# Patient Record
Sex: FEMALE | Race: WHITE | NOT HISPANIC OR LATINO | Employment: FULL TIME | URBAN - METROPOLITAN AREA
[De-identification: names, ages, dates, MRNs, and addresses within clinical notes are randomized per-mention and may not be internally consistent; named-entity substitution may affect disease eponyms.]

---

## 2017-03-15 DIAGNOSIS — E03.8 OTHER SPECIFIED HYPOTHYROIDISM: Primary | ICD-10-CM

## 2017-03-31 DIAGNOSIS — E03.9 ACQUIRED HYPOTHYROIDISM: ICD-10-CM

## 2017-03-31 RX ORDER — LEVOTHYROXINE SODIUM 175 UG/1
TABLET ORAL
Qty: 30 TABLET | Refills: 2 | Status: SHIPPED | OUTPATIENT
Start: 2017-03-31 | End: 2017-07-12 | Stop reason: SDUPTHER

## 2017-06-15 ENCOUNTER — OFFICE VISIT (OUTPATIENT)
Dept: INTERNAL MEDICINE | Age: 37
End: 2017-06-15

## 2017-06-15 VITALS
WEIGHT: 130 LBS | HEART RATE: 62 BPM | HEIGHT: 65 IN | OXYGEN SATURATION: 98 % | SYSTOLIC BLOOD PRESSURE: 110 MMHG | DIASTOLIC BLOOD PRESSURE: 70 MMHG | TEMPERATURE: 98.1 F | BODY MASS INDEX: 21.66 KG/M2

## 2017-06-15 DIAGNOSIS — J02.9 ACUTE PHARYNGITIS, UNSPECIFIED ETIOLOGY: Primary | ICD-10-CM

## 2017-06-15 DIAGNOSIS — E89.0 POSTABLATIVE HYPOTHYROIDISM: Chronic | ICD-10-CM

## 2017-06-15 LAB
T4 FREE SERPL-MCNC: 1.66 NG/DL (ref 0.93–1.7)
TSH SERPL DL<=0.005 MIU/L-ACNC: 12.49 MIU/ML (ref 0.27–4.2)

## 2017-06-15 PROCEDURE — 99213 OFFICE O/P EST LOW 20 MIN: CPT | Performed by: INTERNAL MEDICINE

## 2017-06-15 RX ORDER — AZITHROMYCIN 250 MG/1
TABLET, FILM COATED ORAL
Qty: 6 TABLET | Refills: 0 | Status: SHIPPED | OUTPATIENT
Start: 2017-06-15 | End: 2017-12-14

## 2017-06-15 RX ORDER — CETIRIZINE HYDROCHLORIDE 10 MG/1
10 TABLET ORAL DAILY PRN
COMMUNITY

## 2017-06-15 NOTE — PROGRESS NOTES
"Renetta TRAN Eleanor / 36 y.o. / female  06/15/2017    ASSESSMENT & PLAN:    1. Acute pharyngitis, unspecified etiology    2. Postablative hypothyroidism      Orders Placed This Encounter   Procedures   • TSH+Free T4          Summary/Discussion:     · Z ekta, ibuprofen as needed  · Check TFTs, continue same dose for now      Return in about 6 months (around 12/15/2017) for F/U chronic medical problems.    No future appointments.    ____________________________________________________________________    VITALS    /70  Pulse 62  Temp 98.1 °F (36.7 °C)  Ht 65\" (165.1 cm)  Wt 130 lb (59 kg)  SpO2 98%  BMI 21.63 kg/m2  BP Readings from Last 3 Encounters:   06/15/17 110/70   02/05/17 92/60   12/15/16 122/68     Wt Readings from Last 3 Encounters:   06/15/17 130 lb (59 kg)   02/05/17 127 lb (57.6 kg)   12/15/16 135 lb (61.2 kg)      Body mass index is 21.63 kg/(m^2).    CC:  Main reason(s) for today's visit: Hypothyroidism and Anemia      HPI:     Treated for possible strep at [facility] with amoxicillin x 10 days. C/o ongoing right side sore throat and swollen gland on right side.  Denies fever or chills. Denies cough or sob.     Chronic hypothyroidism s/p ablation for Grave's:  No significant weight change. No change in energy level, change in bowel movements or cold/heat intolerance. Denies significant mood changes. Compliant with medication. Most recent thyroid labs reviewed with the patient:   Lab Results   Component Value Date    TSH 8.760 (H) 12/15/2016    TSH 7.770 (H) 06/13/2016    FREET4 1.76 (H) 12/15/2016    FREET4 1.66 06/13/2016        Patient Care Team:  Óscar Greene MD as PCP - General  ____________________________________________________________________    REVIEW OF SYSTEMS    Review of Systems  As noted per HPI  Constitutional neg   Resp neg   CV neg    Other: As noted per HPI      PHYSICAL EXAMINATION    Physical Exam  Constitutional  No distress   Right posterior pharyngeal erythema  Mildly enlarged " tender anterior cervical LAD  Cardiovascular Rate  normal . Rhythm: regular . Heart sounds:  normal    Pulmonary/Chest  Effort normal. Breath sounds:  normal   Psychiatric  Alert. Judgment and thought content normal. Mood normal      REVIEWED DATA:    Labs:   Lab Results   Component Value Date     06/13/2016    K 4.7 06/13/2016    AST 29 06/13/2016    ALT 23 06/13/2016    BUN 24 (H) 06/13/2016    CREATININE 0.97 06/13/2016    CREATININE 0.97 03/12/2015    EGFRIFNONA 65 06/13/2016    EGFRIFAFRI 79 06/13/2016       Lab Results   Component Value Date    GLU 70 06/13/2016    GLU 78 03/12/2015       No results found for: LDL, HDL, TRIG, CHOLHDLRATIO    Lab Results   Component Value Date    TSH 8.760 (H) 12/15/2016    FREET4 1.76 (H) 12/15/2016          Lab Results   Component Value Date    WBC 3.63 (L) 12/15/2016    HGB 12.8 12/15/2016    HGB 13.7 06/13/2016    HGB 12.8 06/24/2015     12/15/2016        Imaging:        Medical Tests:        Summary of old records / correspondence / consultant report:        Request outside records:        Allergies   Allergen Reactions   • Cefprozil         Current Outpatient Prescriptions   Medication Sig Dispense Refill   • azelastine (ASTELIN) 0.1 % nasal spray 2 sprays into each nostril 2 (two) times a day. Use in each nostril as directed 1 each 5   • calcium carbonate-vitamin d (CALTRATE 600+D) 600-400 MG-UNIT per tablet Take 1 tablet by mouth daily.     • cetirizine (zyrTEC) 10 MG tablet Take 10 mg by mouth Daily.     • cyanocobalamin 1000 MCG/ML injection INJECT 1 MLS SUBCUTANEOUSLY ONCE A MONTH 25 mL 2   • levothyroxine (SYNTHROID, LEVOTHROID) 175 MCG tablet TAKE ONE TABLET BY MOUTH DAILY 30 tablet 2   • norgestimate-ethinyl estradiol (TRI-SPRINTEC) 0.18/0.215/0.25 MG-35 MCG per tablet Take 1 tablet by mouth.         PFSH:     The following portions of the patient's history were reviewed and updated as appropriate: Allergies / Current Medications / Past Medical  History / Surgical History / Social History / Family History    Patient Active Problem List   Diagnosis   • Hypothyroidism   • Pernicious anemia   • Allergic rhinitis due to pollen       Past Medical History:   Diagnosis Date   • Hypothyroidism        Past Surgical History:   Procedure Laterality Date   • BREAST AUGMENTATION         Social History     Social History   • Marital status:      Spouse name: N/A   • Number of children: N/A   • Years of education: N/A     Social History Main Topics   • Smoking status: Never Smoker   • Smokeless tobacco: Never Used   • Alcohol use No   • Drug use: None   • Sexual activity: Not Asked     Other Topics Concern   • None     Social History Narrative       Family History   Problem Relation Age of Onset   • Diabetes Father    • Cancer Paternal Aunt    • Cancer Paternal Uncle    • Cancer Paternal Grandmother          **Nadia Disclaimer:   Much of this encounter note is an electronic transcription/translation of spoken language to printed text. The electronic translation of spoken language may permit erroneous, or at times, nonsensical words or phrases to be inadvertently transcribed. Although I have reviewed the note for such errors, some may still exist.

## 2017-06-16 DIAGNOSIS — E89.0 POSTABLATIVE HYPOTHYROIDISM: Primary | ICD-10-CM

## 2017-06-17 LAB
Lab: NORMAL
T3FREE SERPL-MCNC: 2.2 PG/ML (ref 2–4.4)
WRITTEN AUTHORIZATION: NORMAL

## 2017-07-12 DIAGNOSIS — E03.9 ACQUIRED HYPOTHYROIDISM: ICD-10-CM

## 2017-07-12 RX ORDER — LEVOTHYROXINE SODIUM 175 UG/1
TABLET ORAL
Qty: 30 TABLET | Refills: 5 | Status: SHIPPED | OUTPATIENT
Start: 2017-07-12 | End: 2017-12-18 | Stop reason: SDUPTHER

## 2017-10-26 ENCOUNTER — TELEPHONE (OUTPATIENT)
Dept: INTERNAL MEDICINE | Age: 37
End: 2017-10-26

## 2017-10-26 DIAGNOSIS — D51.0 PERNICIOUS ANEMIA: Primary | Chronic | ICD-10-CM

## 2017-10-26 RX ORDER — SYRINGE W-NEEDLE,DISPOSAB,3 ML 25GX5/8"
SYRINGE, EMPTY DISPOSABLE MISCELLANEOUS
Qty: 100 EACH | Refills: 12 | Status: SHIPPED | OUTPATIENT
Start: 2017-10-26 | End: 2019-12-11 | Stop reason: SDUPTHER

## 2017-12-14 ENCOUNTER — OFFICE VISIT (OUTPATIENT)
Dept: INTERNAL MEDICINE | Age: 37
End: 2017-12-14

## 2017-12-14 VITALS
OXYGEN SATURATION: 99 % | HEART RATE: 58 BPM | BODY MASS INDEX: 21.66 KG/M2 | WEIGHT: 130 LBS | SYSTOLIC BLOOD PRESSURE: 112 MMHG | DIASTOLIC BLOOD PRESSURE: 72 MMHG | TEMPERATURE: 97.6 F | HEIGHT: 65 IN

## 2017-12-14 DIAGNOSIS — D51.0 PERNICIOUS ANEMIA: Chronic | ICD-10-CM

## 2017-12-14 DIAGNOSIS — E89.0 POSTABLATIVE HYPOTHYROIDISM: Primary | Chronic | ICD-10-CM

## 2017-12-14 DIAGNOSIS — R53.83 FATIGUE, UNSPECIFIED TYPE: ICD-10-CM

## 2017-12-14 PROCEDURE — 99214 OFFICE O/P EST MOD 30 MIN: CPT | Performed by: INTERNAL MEDICINE

## 2017-12-14 NOTE — PROGRESS NOTES
"[facility]  SUSAN CASTREJON M.D.      Renetta TRAN Eleanor / 37 y.o. / female  12/14/2017      MEDICATIONS  Current Outpatient Prescriptions   Medication Sig Dispense Refill   • azelastine (ASTELIN) 0.1 % nasal spray 2 sprays into each nostril 2 (two) times a day. Use in each nostril as directed 1 each 5   • calcium carbonate-vitamin d (CALTRATE 600+D) 600-400 MG-UNIT per tablet Take 1 tablet by mouth daily.     • cetirizine (zyrTEC) 10 MG tablet Take 10 mg by mouth Daily.     • cyanocobalamin 1000 MCG/ML injection INJECT 1 MLS SUBCUTANEOUSLY ONCE A MONTH 25 mL 2   • levothyroxine (SYNTHROID, LEVOTHROID) 175 MCG tablet TAKE ONE TABLET BY MOUTH DAILY 30 tablet 5   • norgestimate-ethinyl estradiol (TRI-SPRINTEC) 0.18/0.215/0.25 MG-35 MCG per tablet Take 1 tablet by mouth.     • Syringe 23G X 1\" 3 ML misc Use once a month with B12. 100 each 12     No current facility-administered medications for this visit.          VITALS:    Visit Vitals   • /72   • Pulse 58   • Temp 97.6 °F (36.4 °C)   • Ht 165.1 cm (65\")   • Wt 59 kg (130 lb)   • SpO2 99%   • BMI 21.63 kg/m2       BP Readings from Last 3 Encounters:   12/14/17 112/72   06/15/17 110/70   02/05/17 92/60     Wt Readings from Last 3 Encounters:   12/14/17 59 kg (130 lb)   06/15/17 59 kg (130 lb)   02/05/17 57.6 kg (127 lb)      Body mass index is 21.63 kg/(m^2).    CC:  Main reason(s) for today's visit: Hypothyroidism      HPI:     Chronic hypothyroidism (treated for Grave's):  On stable dose of levothyroxine, no changes in physical symptoms x mild increased fatigue, no change in weight, denies palpitation or chest pain, denies increased anxiety or depression  Lab Results   Component Value Date    TSH 12.490 (H) 06/15/2017    TSH 8.760 (H) 12/15/2016    FREET4 1.66 06/15/2017    FREET4 1.76 (H) 12/15/2016     Pernicious anemia on B12 shots.     C/o increased fatigability when she exercises.   Has regular menstrual cycles.     Patient Care Team:  Óscar Castrejon MD " "as PCP - General  Tiffany Goff MD as Consulting Physician (Obstetrics and Gynecology)  ____________________________________________________________________    ASSESSMENT & PLAN:    Problem List Items Addressed This Visit     Hypothyroidism - Primary (Chronic)    Overview     H/o Grave's Dz s/p Rad I ablation         Current Assessment & Plan     Consider adding Cytomel.          Relevant Medications    levothyroxine (SYNTHROID, LEVOTHROID) 175 MCG tablet    Other Relevant Orders    Comprehensive Metabolic Panel    TSH+Free T4    T3, Free    Pernicious anemia (Chronic)    Relevant Medications    cyanocobalamin 1000 MCG/ML injection    Syringe 23G X 1\" 3 ML misc    Other Relevant Orders    CBC & Differential    Vitamin B12 & Folate    Fe+TIBC+Hussein    Fatigue    Current Assessment & Plan     Nonspecific mild fatigue. Check labs.          Relevant Orders    Vitamin D 25 Hydroxy        Orders Placed This Encounter   Procedures   • Comprehensive Metabolic Panel   • TSH+Free T4   • Vitamin D 25 Hydroxy   • Vitamin B12 & Folate   • Fe+TIBC+Hussein   • T3, Free   • CBC & Differential       Summary/Discussion:  ·     Return in about 6 months (around 6/14/2018) for Annual physical.    Future Appointments  Date Time Provider Department Center   6/7/2018 8:10 AM [facility] None   6/21/2018 9:00 AM [facility] None       ____________________________________________________________________    REVIEW OF SYSTEMS    Review of Systems  As noted per HPI  Constitutional neg fatigue  Resp neg  CV neg  Gi neg  Endo neg  Gu normal menstrual cycles  Right knee pain      PHYSICAL EXAMINATION    Physical Exam  Constitutional  No distress  Neck: No mass, thyroid nodule, thyromegaly or cervical LAD   Cardiovascular Rate  normal . Rhythm: regular . Heart sounds:  Normal  Pulmonary/Chest  Effort normal. Breath sounds:  Normal  Psychiatric  Alert. Judgment and thought content normal. Mood normal    REVIEWED " DATA:    Labs:     Lab Results   Component Value Date     06/13/2016    K 4.7 06/13/2016    AST 29 06/13/2016    ALT 23 06/13/2016    BUN 24 (H) 06/13/2016    CREATININE 0.97 06/13/2016    CREATININE 0.97 03/12/2015    EGFRIFNONA 65 06/13/2016    EGFRIFAFRI 79 06/13/2016       Lab Results   Component Value Date    GLU 70 06/13/2016    GLU 78 03/12/2015       No results found for: LDL, HDL, TRIG, CHOLHDLRATIO    Lab Results   Component Value Date    TSH 12.490 (H) 06/15/2017    FREET4 1.66 06/15/2017       Lab Results   Component Value Date    WBC 3.63 (L) 12/15/2016    HGB 12.8 12/15/2016    HGB 13.7 06/13/2016    HGB 12.8 06/24/2015     12/15/2016       Imaging:         Medical Tests:         Summary of old records / correspondence / consultant report:         Request outside records:         ALLERGIES  Allergies   Allergen Reactions   • Cefprozil         PFSH:     The following portions of the patient's history were reviewed and updated as appropriate: Allergies / Current Medications / Past Medical History / Surgical History / Social History / Family History    PROBLEM LIST   Patient Active Problem List   Diagnosis   • Hypothyroidism   • Pernicious anemia   • Allergic rhinitis due to pollen   • Fatigue       PAST MEDICAL HISTORY  Past Medical History:   Diagnosis Date   • Hypothyroidism        SURGICAL HISTORY  Past Surgical History:   Procedure Laterality Date   • BREAST AUGMENTATION         SOCIAL HISTORY  Social History     Social History   • Marital status:      Spouse name: N/A   • Number of children: N/A   • Years of education: N/A     Social History Main Topics   • Smoking status: Never Smoker   • Smokeless tobacco: Never Used   • Alcohol use No   • Drug use: None   • Sexual activity: Not Asked     Other Topics Concern   • None     Social History Narrative       FAMILY HISTORY  Family History   Problem Relation Age of Onset   • Diabetes Father    • Cancer Paternal Aunt    • Cancer  Paternal Uncle    • Cancer Paternal Grandmother          **Nadia Disclaimer:   Much of this encounter note is an electronic transcription/translation of spoken language to printed text. The electronic translation of spoken language may permit erroneous, or at times, nonsensical words or phrases to be inadvertently transcribed. Although I have reviewed the note for such errors, some may still exist.

## 2017-12-15 LAB
25(OH)D3+25(OH)D2 SERPL-MCNC: 52 NG/ML (ref 30–100)
ALBUMIN SERPL-MCNC: 4.5 G/DL (ref 3.5–5.2)
ALBUMIN/GLOB SERPL: 1.4 G/DL
ALP SERPL-CCNC: 67 U/L (ref 39–117)
ALT SERPL-CCNC: 16 U/L (ref 1–33)
AST SERPL-CCNC: 29 U/L (ref 1–32)
BASOPHILS # BLD AUTO: 0.01 10*3/MM3 (ref 0–0.2)
BASOPHILS NFR BLD AUTO: 0.3 % (ref 0–1.5)
BILIRUB SERPL-MCNC: 0.5 MG/DL (ref 0.1–1.2)
BUN SERPL-MCNC: 23 MG/DL (ref 6–20)
BUN/CREAT SERPL: 27.4 (ref 7–25)
CALCIUM SERPL-MCNC: 9.4 MG/DL (ref 8.6–10.5)
CHLORIDE SERPL-SCNC: 101 MMOL/L (ref 98–107)
CO2 SERPL-SCNC: 28.1 MMOL/L (ref 22–29)
CREAT SERPL-MCNC: 0.84 MG/DL (ref 0.57–1)
EOSINOPHIL # BLD AUTO: 0.07 10*3/MM3 (ref 0–0.7)
EOSINOPHIL NFR BLD AUTO: 2.2 % (ref 0.3–6.2)
ERYTHROCYTE [DISTWIDTH] IN BLOOD BY AUTOMATED COUNT: 13.6 % (ref 11.7–13)
FOLATE SERPL-MCNC: 10.54 NG/ML (ref 4.78–24.2)
GFR SERPLBLD CREATININE-BSD FMLA CKD-EPI: 76 ML/MIN/1.73
GFR SERPLBLD CREATININE-BSD FMLA CKD-EPI: 92 ML/MIN/1.73
GLOBULIN SER CALC-MCNC: 3.2 GM/DL
GLUCOSE SERPL-MCNC: 79 MG/DL (ref 65–99)
HCT VFR BLD AUTO: 41.9 % (ref 35.6–45.5)
HGB BLD-MCNC: 13.4 G/DL (ref 11.9–15.5)
IMM GRANULOCYTES # BLD: 0 10*3/MM3 (ref 0–0.03)
IMM GRANULOCYTES NFR BLD: 0 % (ref 0–0.5)
LYMPHOCYTES # BLD AUTO: 1.22 10*3/MM3 (ref 0.9–4.8)
LYMPHOCYTES NFR BLD AUTO: 38.6 % (ref 19.6–45.3)
MCH RBC QN AUTO: 30.3 PG (ref 26.9–32)
MCHC RBC AUTO-ENTMCNC: 32 G/DL (ref 32.4–36.3)
MCV RBC AUTO: 94.8 FL (ref 80.5–98.2)
MONOCYTES # BLD AUTO: 0.25 10*3/MM3 (ref 0.2–1.2)
MONOCYTES NFR BLD AUTO: 7.9 % (ref 5–12)
NEUTROPHILS # BLD AUTO: 1.61 10*3/MM3 (ref 1.9–8.1)
NEUTROPHILS NFR BLD AUTO: 51 % (ref 42.7–76)
PLATELET # BLD AUTO: 245 10*3/MM3 (ref 140–500)
POTASSIUM SERPL-SCNC: 4.5 MMOL/L (ref 3.5–5.2)
PROT SERPL-MCNC: 7.7 G/DL (ref 6–8.5)
RBC # BLD AUTO: 4.42 10*6/MM3 (ref 3.9–5.2)
SODIUM SERPL-SCNC: 140 MMOL/L (ref 136–145)
T3FREE SERPL-MCNC: 2.5 PG/ML (ref 2–4.4)
T4 FREE SERPL-MCNC: 1.94 NG/DL (ref 0.93–1.7)
TSH SERPL DL<=0.005 MIU/L-ACNC: 8.2 MIU/ML (ref 0.27–4.2)
VIT B12 SERPL-MCNC: 271 PG/ML (ref 211–946)
WBC # BLD AUTO: 3.16 10*3/MM3 (ref 4.5–10.7)

## 2017-12-18 ENCOUNTER — TELEPHONE (OUTPATIENT)
Dept: INTERNAL MEDICINE | Age: 37
End: 2017-12-18

## 2017-12-18 DIAGNOSIS — E53.8 B12 DEFICIENCY: ICD-10-CM

## 2017-12-18 DIAGNOSIS — E03.9 ACQUIRED HYPOTHYROIDISM: ICD-10-CM

## 2017-12-18 RX ORDER — CYANOCOBALAMIN 1000 UG/ML
1000 INJECTION, SOLUTION INTRAMUSCULAR; SUBCUTANEOUS
Qty: 25 ML | Refills: 2 | Status: SHIPPED | OUTPATIENT
Start: 2017-12-18 | End: 2018-09-05 | Stop reason: SDUPTHER

## 2017-12-18 RX ORDER — LEVOTHYROXINE SODIUM 150 MCG
150 TABLET ORAL DAILY
Qty: 90 TABLET | Refills: 1 | Status: SHIPPED | OUTPATIENT
Start: 2017-12-18 | End: 2018-06-29 | Stop reason: SDUPTHER

## 2017-12-18 RX ORDER — LIOTHYRONINE SODIUM 5 UG/1
5 TABLET ORAL EVERY MORNING
Qty: 90 TABLET | Refills: 1 | Status: SHIPPED | OUTPATIENT
Start: 2017-12-18 | End: 2018-02-02 | Stop reason: SDUPTHER

## 2017-12-18 NOTE — ASSESSMENT & PLAN NOTE
Pt to decrease synthroid to 150 mcg and add cytomel 5 mcg in the morning pt was unable to schedule 6 week lab apt will call back to schedule for 6 weeks TFT's ALVIN

## 2017-12-18 NOTE — PROGRESS NOTES
Call patient with her test result(s) and mail the results to her if MyChart is NOT active.    1. Thyroid level is still not where it needs to be. Decrease Synthroid (No Substitute) to 150 mcg (verify on 175). Start Cytomel 5 mcg qAM (this is T3). Recheck TSH, FREE T4 AND FREE T3 in 6 weeks. Watch for any unusual change in energy level, anxiety, palpitations, etc.   2. B12 is low: Make sure to take B12 shots regularly as instructed. If she has not been taking it regularly, take it twice monthly x 3 months then once monthly.

## 2017-12-18 NOTE — TELEPHONE ENCOUNTER
----- Message from Óscar Greene MD sent at 12/18/2017  7:48 AM EST -----  Call patient with her test result(s) and mail the results to her if MyChart is NOT active.    1. Thyroid level is still not where it needs to be. Decrease Synthroid (No Substitute) to 150 mcg (verify on 175). Start Cytomel 5 mcg qAM (this is T3). Recheck TSH, FREE T4 AND FREE T3 in 6 weeks. Watch for any unusual change in energy level, anxiety, palpitations, etc.   2. B12 is low: Make sure to take B12 shots regularly as instructed. If she has not been taking it regularly, take it twice monthly x 3 months then once monthly.

## 2018-01-26 DIAGNOSIS — E89.0 POSTABLATIVE HYPOTHYROIDISM: Primary | ICD-10-CM

## 2018-01-26 DIAGNOSIS — E03.9 ACQUIRED HYPOTHYROIDISM: ICD-10-CM

## 2018-02-02 ENCOUNTER — TELEPHONE (OUTPATIENT)
Dept: INTERNAL MEDICINE | Age: 38
End: 2018-02-02

## 2018-02-02 LAB
T3FREE SERPL-MCNC: 2.9 PG/ML (ref 2–4.4)
T4 FREE SERPL-MCNC: 1.56 NG/DL (ref 0.93–1.7)
TSH SERPL DL<=0.005 MIU/L-ACNC: 8.96 MIU/ML (ref 0.27–4.2)

## 2018-02-02 RX ORDER — LIOTHYRONINE SODIUM 5 UG/1
10 TABLET ORAL EVERY MORNING
Qty: 60 TABLET | Refills: 3 | Status: SHIPPED | OUTPATIENT
Start: 2018-02-02 | End: 2018-05-23 | Stop reason: SDUPTHER

## 2018-02-02 NOTE — TELEPHONE ENCOUNTER
----- Message from Óscar Greene MD sent at 2/2/2018  7:52 AM EST -----  Call patient with her test result(s) and mail the results to her if MyChart is NOT active.    Increase Cytomel (T3) to 5 mcg TWO daily. I have sent the Rx to the pharmacy already. Schedule TSH, Free T4 and Free T3 for 2 months.

## 2018-02-02 NOTE — TELEPHONE ENCOUNTER
LM for pt to call back to schedule lab apt and discuss lab if she would like to do so. ALVIN    Increased cytomel 5 mcg to two tabs daily per Dr Greene. Will recheck labs in two months

## 2018-05-23 DIAGNOSIS — E03.9 ACQUIRED HYPOTHYROIDISM: ICD-10-CM

## 2018-05-23 RX ORDER — LIOTHYRONINE SODIUM 5 UG/1
TABLET ORAL
Qty: 90 TABLET | Refills: 0 | Status: SHIPPED | OUTPATIENT
Start: 2018-05-23 | End: 2018-07-11 | Stop reason: SDUPTHER

## 2018-06-06 DIAGNOSIS — Z00.00 PREVENTATIVE HEALTH CARE: Primary | ICD-10-CM

## 2018-06-29 ENCOUNTER — TELEPHONE (OUTPATIENT)
Dept: INTERNAL MEDICINE | Age: 38
End: 2018-06-29

## 2018-06-29 DIAGNOSIS — E03.9 ACQUIRED HYPOTHYROIDISM: ICD-10-CM

## 2018-06-29 RX ORDER — LEVOTHYROXINE SODIUM 150 MCG
150 TABLET ORAL DAILY
Qty: 30 TABLET | Refills: 0 | Status: SHIPPED | OUTPATIENT
Start: 2018-06-29 | End: 2018-07-09 | Stop reason: SDUPTHER

## 2018-07-09 ENCOUNTER — OFFICE VISIT (OUTPATIENT)
Dept: INTERNAL MEDICINE | Age: 38
End: 2018-07-09

## 2018-07-09 VITALS
HEIGHT: 65 IN | WEIGHT: 135 LBS | SYSTOLIC BLOOD PRESSURE: 112 MMHG | BODY MASS INDEX: 22.49 KG/M2 | TEMPERATURE: 98.8 F | DIASTOLIC BLOOD PRESSURE: 72 MMHG | OXYGEN SATURATION: 100 % | HEART RATE: 54 BPM

## 2018-07-09 DIAGNOSIS — D51.0 PERNICIOUS ANEMIA: Chronic | ICD-10-CM

## 2018-07-09 DIAGNOSIS — E03.9 ACQUIRED HYPOTHYROIDISM: Primary | ICD-10-CM

## 2018-07-09 PROCEDURE — 99213 OFFICE O/P EST LOW 20 MIN: CPT | Performed by: INTERNAL MEDICINE

## 2018-07-09 RX ORDER — LEVOTHYROXINE SODIUM 150 MCG
150 TABLET ORAL DAILY
Qty: 90 TABLET | Refills: 0 | Status: SHIPPED | OUTPATIENT
Start: 2018-07-09 | End: 2018-08-17

## 2018-07-09 NOTE — PROGRESS NOTES
"[facility] [place] [facility]  SUSAN CASTREJON M.D.      Renetta TRAN Eleanor / 37 y.o. / female  07/09/2018      ASSESSMENT & PLAN:    Problem List Items Addressed This Visit        High    Hypothyroidism - Primary (Chronic)    Overview     H/o Grave's Dz s/p Rad I ablation         Current Assessment & Plan     Had been taking Cytomel 5 mg twice daily 3 days a week along with Synthroid 150 mcg daily. Check labs. Will need to modify dose of Cytomel pending labs to either 10 mcg dose or taking 5 mcg every other day.          Relevant Medications    liothyronine (CYTOMEL) 5 MCG tablet    SYNTHROID 150 MCG tablet    Other Relevant Orders    TSH+Free T4    T3, Free    Lipid Panel With / Chol / HDL Ratio       Medium    Pernicious anemia (Chronic)    Current Assessment & Plan     Continue B12 injections twice monthly.          Relevant Medications    Syringe 23G X 1\" 3 ML misc    cyanocobalamin 1000 MCG/ML injection    Other Relevant Orders    CBC & Differential    Comprehensive Metabolic Panel        Orders Placed This Encounter   Procedures   • TSH+Free T4   • T3, Free   • Comprehensive Metabolic Panel   • Lipid Panel With / Chol / HDL Ratio   • CBC & Differential     New Medications Ordered This Visit   Medications   • SYNTHROID 150 MCG tablet     Sig: Take 1 tablet by mouth Daily.     Dispense:  90 tablet     Refill:  0       Summary/Discussion:      Return in about 6 months (around 1/9/2019) for Hypothyroidism.    ____________________________________________________________________    MEDICATIONS  Current Outpatient Prescriptions   Medication Sig Dispense Refill   • azelastine (ASTELIN) 0.1 % nasal spray 2 sprays into each nostril 2 (two) times a day. Use in each nostril as directed 1 each 5   • calcium carbonate-vitamin d (CALTRATE 600+D) 600-400 MG-UNIT per tablet Take 1 tablet by mouth daily.     • cetirizine (zyrTEC) 10 MG tablet Take 10 mg by mouth Daily.     • cyanocobalamin 1000 MCG/ML injection Inject 1 mL into the shoulder, " "thigh, or buttocks Every 28 (Twenty-Eight) Days. 25 mL 2   • liothyronine (CYTOMEL) 5 MCG tablet Was taking twice daily 3 days of week and 1 a day all other days 90 tablet 0   • norgestimate-ethinyl estradiol (TRI-SPRINTEC) 0.18/0.215/0.25 MG-35 MCG per tablet Take 1 tablet by mouth.     • SYNTHROID 150 MCG tablet Take 1 tablet by mouth Daily. 90 tablet 0   • Syringe 23G X 1\" 3 ML misc Use once a month with B12. 100 each 12     No current facility-administered medications for this visit.          VITALS:    Visit Vitals  /72   Pulse 54   Temp 98.8 °F (37.1 °C)   Ht 165.1 cm (65\")   Wt 61.2 kg (135 lb)   SpO2 100%   BMI 22.47 kg/m²       BP Readings from Last 3 Encounters:   07/09/18 112/72   12/14/17 112/72   06/15/17 110/70     Wt Readings from Last 3 Encounters:   07/09/18 61.2 kg (135 lb)   12/14/17 59 kg (130 lb)   06/15/17 59 kg (130 lb)      Body mass index is 22.47 kg/m².    CC:  Main reason(s) for today's visit: Hypothyroidism      HPI:     Chronic hypothyroidism:  no changes in physical symptoms. Had been taking Cytomel 5 mg twice daily 3 days a week along with Synthroid 150 mcg daily.  Lab Results   Component Value Date    TSH 8.960 (H) 02/01/2018    TSH 8.200 (H) 12/14/2017    FREET4 1.56 02/01/2018    FREET4 1.94 (H) 12/14/2017     Pernicious anemia: feels better taking B12 shots twice monthly.     Patient Care Team:  Óscar Greene MD as PCP - General  Tiffany Goff MD as Consulting Physician (Obstetrics and Gynecology)    ____________________________________________________________________    REVIEW OF SYSTEMS    Review of Systems  Constitutional neg  Resp neg  CV neg  GI neg  Neuro neg  Psych neg      PHYSICAL EXAMINATION    Physical Exam  Constitutional  No distress  Neck: No mass, thyroid nodule, thyromegaly or cervical LAD   Cardiovascular Rate  normal . Rhythm: regular . Heart sounds:  Normal  Pulmonary/Chest  Effort normal. Breath sounds:  Normal  Psychiatric  Alert. Judgment and thought " content normal. Mood normal    REVIEWED DATA:    Labs:     Lab Results   Component Value Date     12/14/2017    K 4.5 12/14/2017    AST 29 12/14/2017    ALT 16 12/14/2017    BUN 23 (H) 12/14/2017    CREATININE 0.84 12/14/2017    CREATININE 0.97 06/13/2016    CREATININE 0.97 03/12/2015    EGFRIFNONA 76 12/14/2017    EGFRIFAFRI 92 12/14/2017       No results found for: HGBA1C, GLUCOSE, MICROALBUR    No results found for: LDL, HDL, TRIG, CHOLHDLRATIO    Lab Results   Component Value Date    TSH 8.960 (H) 02/01/2018    FREET4 1.56 02/01/2018       Lab Results   Component Value Date    WBC 3.16 (L) 12/14/2017    HGB 13.4 12/14/2017    HGB 12.8 12/15/2016    HGB 13.7 06/13/2016     12/14/2017       Imaging:         Medical Tests:         Summary of old records / correspondence / consultant report:         Request outside records:         ALLERGIES  Allergies   Allergen Reactions   • Cefprozil         PFSH:     The following portions of the patient's history were reviewed and updated as appropriate: Allergies / Current Medications / Past Medical History / Surgical History / Social History / Family History    PROBLEM LIST   Patient Active Problem List   Diagnosis   • Hypothyroidism   • Pernicious anemia   • Allergic rhinitis due to pollen   • Fatigue       PAST MEDICAL HISTORY  Past Medical History:   Diagnosis Date   • Hypothyroidism        SURGICAL HISTORY  Past Surgical History:   Procedure Laterality Date   • BREAST AUGMENTATION         SOCIAL HISTORY  Social History     Social History   • Marital status:      Social History Main Topics   • Smoking status: Never Smoker   • Smokeless tobacco: Never Used   • Alcohol use No   • Drug use: Unknown     Other Topics Concern   • Not on file       FAMILY HISTORY  Family History   Problem Relation Age of Onset   • Diabetes Father    • Cancer Paternal Aunt    • Cancer Paternal Uncle    • Cancer Paternal Grandmother          **Nadia Disclaimer:   Much of this  encounter note is an electronic transcription/translation of spoken language to printed text. The electronic translation of spoken language may permit erroneous, or at times, nonsensical words or phrases to be inadvertently transcribed. Although I have reviewed the note for such errors, some may still exist.

## 2018-07-09 NOTE — ASSESSMENT & PLAN NOTE
Had been taking Cytomel 5 mg twice daily 3 days a week along with Synthroid 150 mcg daily. Check labs. Will need to modify dose of Cytomel pending labs to either 10 mcg dose or taking 5 mcg every other day.

## 2018-07-10 LAB
ALBUMIN SERPL-MCNC: 4.4 G/DL (ref 3.5–5.2)
ALBUMIN/GLOB SERPL: 1.6 G/DL
ALP SERPL-CCNC: 54 U/L (ref 39–117)
ALT SERPL-CCNC: 13 U/L (ref 1–33)
AST SERPL-CCNC: 24 U/L (ref 1–32)
BASOPHILS # BLD AUTO: 0.01 10*3/MM3 (ref 0–0.2)
BASOPHILS NFR BLD AUTO: 0.4 % (ref 0–1.5)
BILIRUB SERPL-MCNC: 0.4 MG/DL (ref 0.1–1.2)
BUN SERPL-MCNC: 20 MG/DL (ref 6–20)
BUN/CREAT SERPL: 18.9 (ref 7–25)
CALCIUM SERPL-MCNC: 9.6 MG/DL (ref 8.6–10.5)
CHLORIDE SERPL-SCNC: 103 MMOL/L (ref 98–107)
CHOLEST SERPL-MCNC: 178 MG/DL (ref 0–200)
CHOLEST/HDLC SERPL: 3.12 {RATIO}
CO2 SERPL-SCNC: 25.8 MMOL/L (ref 22–29)
CREAT SERPL-MCNC: 1.06 MG/DL (ref 0.57–1)
EOSINOPHIL # BLD AUTO: 0.06 10*3/MM3 (ref 0–0.7)
EOSINOPHIL NFR BLD AUTO: 2.1 % (ref 0.3–6.2)
ERYTHROCYTE [DISTWIDTH] IN BLOOD BY AUTOMATED COUNT: 12.9 % (ref 11.7–13)
GLOBULIN SER CALC-MCNC: 2.7 GM/DL
GLUCOSE SERPL-MCNC: 68 MG/DL (ref 65–99)
HCT VFR BLD AUTO: 41.6 % (ref 35.6–45.5)
HDLC SERPL-MCNC: 57 MG/DL (ref 40–60)
HGB BLD-MCNC: 13.9 G/DL (ref 11.9–15.5)
IMM GRANULOCYTES # BLD: 0 10*3/MM3 (ref 0–0.03)
IMM GRANULOCYTES NFR BLD: 0 % (ref 0–0.5)
LDLC SERPL CALC-MCNC: 109 MG/DL (ref 0–100)
LYMPHOCYTES # BLD AUTO: 1.08 10*3/MM3 (ref 0.9–4.8)
LYMPHOCYTES NFR BLD AUTO: 38.2 % (ref 19.6–45.3)
MCH RBC QN AUTO: 32.3 PG (ref 26.9–32)
MCHC RBC AUTO-ENTMCNC: 33.4 G/DL (ref 32.4–36.3)
MCV RBC AUTO: 96.7 FL (ref 80.5–98.2)
MONOCYTES # BLD AUTO: 0.23 10*3/MM3 (ref 0.2–1.2)
MONOCYTES NFR BLD AUTO: 8.1 % (ref 5–12)
NEUTROPHILS # BLD AUTO: 1.45 10*3/MM3 (ref 1.9–8.1)
NEUTROPHILS NFR BLD AUTO: 51.2 % (ref 42.7–76)
PLATELET # BLD AUTO: 207 10*3/MM3 (ref 140–500)
POTASSIUM SERPL-SCNC: 4.7 MMOL/L (ref 3.5–5.2)
PROT SERPL-MCNC: 7.1 G/DL (ref 6–8.5)
RBC # BLD AUTO: 4.3 10*6/MM3 (ref 3.9–5.2)
SODIUM SERPL-SCNC: 142 MMOL/L (ref 136–145)
T3FREE SERPL-MCNC: 3.2 PG/ML (ref 2–4.4)
T4 FREE SERPL-MCNC: 1.45 NG/DL (ref 0.93–1.7)
TRIGL SERPL-MCNC: 59 MG/DL (ref 0–150)
TSH SERPL DL<=0.005 MIU/L-ACNC: 13.86 MIU/ML (ref 0.27–4.2)
VLDLC SERPL CALC-MCNC: 11.8 MG/DL (ref 5–40)
WBC # BLD AUTO: 2.83 10*3/MM3 (ref 4.5–10.7)

## 2018-07-10 NOTE — PROGRESS NOTES
Call patient with her test result(s) and mail the results to her if MyChart is NOT active.    1. Thyroid level is marginal. Continue Synthroid 150 mcg daily but increase Cytomel to 10 mcg qAM. Recheck TSH, Free T3 AND Free T4 levels in 6 weeks.   2. WBC is little lower. Will follow.   3. Cholesterol level is okay.     (REMINDER: Update med list, maintain dx association, and update change on CURRENT ASSESSMENT/PLAN)

## 2018-07-11 DIAGNOSIS — E03.9 ACQUIRED HYPOTHYROIDISM: ICD-10-CM

## 2018-07-11 RX ORDER — LIOTHYRONINE SODIUM 5 UG/1
TABLET ORAL
Qty: 180 TABLET | Refills: 0 | Status: SHIPPED | OUTPATIENT
Start: 2018-07-11 | End: 2018-08-24 | Stop reason: SDUPTHER

## 2018-08-15 DIAGNOSIS — E89.0 POSTABLATIVE HYPOTHYROIDISM: Primary | Chronic | ICD-10-CM

## 2018-08-17 ENCOUNTER — TELEPHONE (OUTPATIENT)
Dept: INTERNAL MEDICINE | Age: 38
End: 2018-08-17

## 2018-08-17 DIAGNOSIS — E03.9 ACQUIRED HYPOTHYROIDISM: ICD-10-CM

## 2018-08-17 LAB
T3FREE SERPL-MCNC: 3.9 PG/ML (ref 2–4.4)
T4 FREE SERPL-MCNC: 1.42 NG/DL (ref 0.93–1.7)
TSH SERPL DL<=0.005 MIU/L-ACNC: 12.82 MIU/ML (ref 0.27–4.2)

## 2018-08-17 RX ORDER — LEVOTHYROXINE SODIUM 175 MCG
175 TABLET ORAL DAILY
Qty: 30 TABLET | Refills: 3 | Status: SHIPPED | OUTPATIENT
Start: 2018-08-17 | End: 2018-10-18

## 2018-08-17 NOTE — TELEPHONE ENCOUNTER
Pt results and new medication via my chart let her know to,call back and schedule TFT's in 2 months. Pt told to call back if she would like to go over results by phone. ALVIN

## 2018-08-17 NOTE — PROGRESS NOTES
Call patient with her test result(s) and mail the results to her if MyChart is NOT active.    Increase levothyroxine to 175 mcg (confirm on 150 mcg) (SYNTHROID / NO SUBSTITUTE) and continue Cytomel at 10 mg daily. Recheck TFT's 2 months

## 2018-08-17 NOTE — TELEPHONE ENCOUNTER
----- Message from Óscar Greene MD sent at 8/17/2018  8:48 AM EDT -----  Call patient with her test result(s) and mail the results to her if MyChart is NOT active.    Increase levothyroxine to 175 mcg (confirm on 150 mcg) (SYNTHROID / NO SUBSTITUTE) and continue Cytomel at 10 mg daily. Recheck TFT's 2 months

## 2018-08-22 ENCOUNTER — TELEPHONE (OUTPATIENT)
Dept: INTERNAL MEDICINE | Age: 38
End: 2018-08-22

## 2018-08-22 DIAGNOSIS — E89.0 POSTABLATIVE HYPOTHYROIDISM: Primary | Chronic | ICD-10-CM

## 2018-08-22 RX ORDER — LEVOTHYROXINE SODIUM 25 MCG
25 TABLET ORAL DAILY
Qty: 30 TABLET | Refills: 0 | Status: SHIPPED | OUTPATIENT
Start: 2018-08-22 | End: 2018-10-18

## 2018-08-22 NOTE — TELEPHONE ENCOUNTER
Pt is wanting to know since her Levothyroxine was raised to 175 mg and she has about 20 days left of her 150 mg, pt asking if Dr Greene could prescribe her a 25 mg to add to her 150 so she doesn't waste them?  Pt's # [phone]  Thanks SP

## 2018-08-24 DIAGNOSIS — E03.9 ACQUIRED HYPOTHYROIDISM: ICD-10-CM

## 2018-08-24 RX ORDER — LIOTHYRONINE SODIUM 5 UG/1
TABLET ORAL
Qty: 90 TABLET | Refills: 3 | Status: SHIPPED | OUTPATIENT
Start: 2018-08-24 | End: 2018-11-06 | Stop reason: SDUPTHER

## 2018-09-05 ENCOUNTER — TELEPHONE (OUTPATIENT)
Dept: INTERNAL MEDICINE | Age: 38
End: 2018-09-05

## 2018-09-05 DIAGNOSIS — E53.8 B12 DEFICIENCY: ICD-10-CM

## 2018-09-05 RX ORDER — CYANOCOBALAMIN 1000 UG/ML
1000 INJECTION, SOLUTION INTRAMUSCULAR; SUBCUTANEOUS
Qty: 25 ML | Refills: 2 | Status: SHIPPED | OUTPATIENT
Start: 2018-09-05 | End: 2019-12-11 | Stop reason: SDUPTHER

## 2018-09-05 NOTE — TELEPHONE ENCOUNTER
Patient called to request a RX refill on her Cyanocobalmin 1000mcg/ML injection, she gives herself the injection every two weeks.  Her insurance is requiring her to switch to [facility] [employer] on [address] @ [phone].

## 2018-10-12 ENCOUNTER — RESULTS ENCOUNTER (OUTPATIENT)
Dept: INTERNAL MEDICINE | Age: 38
End: 2018-10-12

## 2018-10-12 DIAGNOSIS — E03.9 ACQUIRED HYPOTHYROIDISM: ICD-10-CM

## 2018-10-12 NOTE — TELEPHONE ENCOUNTER
Rx sent to pharmacy for qty of 30. Pt no-showed last appt, as well as multiple lab appts.     KD  
SYNTHROID 150 MCG tablet Take 1 tablet by mouth Daily.    [facility] [place] [place] - [address] AT [address]  - [phone]  - [phone] FX    Made patient an appt for 7/9/18. She needs enough medicine to make to appt.    Thank you    
89

## 2018-10-17 LAB
T4 FREE SERPL-MCNC: 1.22 NG/DL (ref 0.93–1.7)
TSH SERPL DL<=0.005 MIU/L-ACNC: 7.21 MIU/ML (ref 0.27–4.2)

## 2018-10-18 DIAGNOSIS — E89.0 POSTABLATIVE HYPOTHYROIDISM: Chronic | ICD-10-CM

## 2018-10-18 DIAGNOSIS — E03.9 ACQUIRED HYPOTHYROIDISM: ICD-10-CM

## 2018-10-18 RX ORDER — LEVOTHYROXINE SODIUM 88 MCG
TABLET ORAL
Qty: 90 TABLET | Refills: 0 | Status: SHIPPED | OUTPATIENT
Start: 2018-10-18 | End: 2018-11-06 | Stop reason: SDUPTHER

## 2018-10-18 NOTE — ASSESSMENT & PLAN NOTE
Pt to increase Synthroid brand name to  2 88 mcg tabs in the Am and recheck TFT's in 2 months. Per Dr Greene. MORGANJ

## 2018-11-06 DIAGNOSIS — E89.0 POSTABLATIVE HYPOTHYROIDISM: Chronic | ICD-10-CM

## 2018-11-06 DIAGNOSIS — E03.9 ACQUIRED HYPOTHYROIDISM: ICD-10-CM

## 2018-11-06 RX ORDER — LIOTHYRONINE SODIUM 5 UG/1
10 TABLET ORAL EVERY MORNING
Qty: 180 TABLET | Refills: 1 | Status: SHIPPED | OUTPATIENT
Start: 2018-11-06 | End: 2019-05-08 | Stop reason: SDUPTHER

## 2018-11-06 RX ORDER — LEVOTHYROXINE SODIUM 88 MCG
TABLET ORAL
Qty: 180 TABLET | Refills: 1 | Status: SHIPPED | OUTPATIENT
Start: 2018-11-06 | End: 2018-11-16 | Stop reason: SDUPTHER

## 2018-11-16 DIAGNOSIS — E03.9 ACQUIRED HYPOTHYROIDISM: ICD-10-CM

## 2018-11-16 DIAGNOSIS — E89.0 POSTABLATIVE HYPOTHYROIDISM: Chronic | ICD-10-CM

## 2018-11-16 RX ORDER — LEVOTHYROXINE SODIUM 88 UG/1
TABLET ORAL
Qty: 90 TABLET | Refills: 1 | Status: SHIPPED | OUTPATIENT
Start: 2018-11-16 | End: 2019-06-03 | Stop reason: SDUPTHER

## 2018-12-17 DIAGNOSIS — E89.0 POSTABLATIVE HYPOTHYROIDISM: Primary | Chronic | ICD-10-CM

## 2019-05-08 DIAGNOSIS — E03.9 ACQUIRED HYPOTHYROIDISM: ICD-10-CM

## 2019-05-08 RX ORDER — LIOTHYRONINE SODIUM 5 UG/1
10 TABLET ORAL EVERY MORNING
Qty: 60 TABLET | Refills: 0 | Status: SHIPPED | OUTPATIENT
Start: 2019-05-08 | End: 2019-06-03 | Stop reason: SDUPTHER

## 2019-06-01 DIAGNOSIS — E03.9 ACQUIRED HYPOTHYROIDISM: ICD-10-CM

## 2019-06-01 DIAGNOSIS — E89.0 POSTABLATIVE HYPOTHYROIDISM: Chronic | ICD-10-CM

## 2019-06-03 RX ORDER — LIOTHYRONINE SODIUM 5 UG/1
10 TABLET ORAL EVERY MORNING
Qty: 30 TABLET | Refills: 0 | Status: SHIPPED | OUTPATIENT
Start: 2019-06-03 | End: 2019-07-13 | Stop reason: SDUPTHER

## 2019-06-03 RX ORDER — LEVOTHYROXINE SODIUM 88 MCG
TABLET ORAL
Qty: 30 TABLET | Refills: 0 | Status: SHIPPED | OUTPATIENT
Start: 2019-06-03 | End: 2019-07-13 | Stop reason: SDUPTHER

## 2019-06-13 DIAGNOSIS — E03.9 ACQUIRED HYPOTHYROIDISM: ICD-10-CM

## 2019-06-13 RX ORDER — LIOTHYRONINE SODIUM 5 UG/1
10 TABLET ORAL EVERY MORNING
Qty: 60 TABLET | Refills: 0 | OUTPATIENT
Start: 2019-06-13

## 2019-07-13 DIAGNOSIS — E03.9 ACQUIRED HYPOTHYROIDISM: ICD-10-CM

## 2019-07-13 DIAGNOSIS — E89.0 POSTABLATIVE HYPOTHYROIDISM: Chronic | ICD-10-CM

## 2019-07-15 RX ORDER — LEVOTHYROXINE SODIUM 88 MCG
TABLET ORAL
Qty: 15 TABLET | Refills: 0 | Status: SHIPPED | OUTPATIENT
Start: 2019-07-15 | End: 2019-09-05 | Stop reason: SDUPTHER

## 2019-07-15 RX ORDER — LIOTHYRONINE SODIUM 5 UG/1
10 TABLET ORAL EVERY MORNING
Qty: 30 TABLET | Refills: 0 | Status: SHIPPED | OUTPATIENT
Start: 2019-07-15 | End: 2019-09-05 | Stop reason: SDUPTHER

## 2019-08-31 LAB
T3FREE SERPL-MCNC: 5.1 PG/ML (ref 2–4.4)
T4 FREE SERPL-MCNC: 1.73 NG/DL (ref 0.93–1.7)
TSH SERPL DL<=0.005 MIU/L-ACNC: 1.77 UIU/ML (ref 0.27–4.2)

## 2019-09-05 DIAGNOSIS — E03.9 ACQUIRED HYPOTHYROIDISM: ICD-10-CM

## 2019-09-05 DIAGNOSIS — E89.0 POSTABLATIVE HYPOTHYROIDISM: Chronic | ICD-10-CM

## 2019-09-05 RX ORDER — LEVOTHYROXINE SODIUM 88 MCG
TABLET ORAL
Qty: 60 TABLET | Refills: 2 | Status: SHIPPED | OUTPATIENT
Start: 2019-09-05 | End: 2019-12-11 | Stop reason: SDUPTHER

## 2019-09-05 RX ORDER — LIOTHYRONINE SODIUM 5 UG/1
5 TABLET ORAL EVERY MORNING
Qty: 30 TABLET | Refills: 2 | Status: SHIPPED | OUTPATIENT
Start: 2019-09-05 | End: 2019-09-05 | Stop reason: SDUPTHER

## 2019-09-05 RX ORDER — LIOTHYRONINE SODIUM 5 UG/1
5 TABLET ORAL EVERY MORNING
Qty: 30 TABLET | Refills: 2 | Status: SHIPPED | OUTPATIENT
Start: 2019-09-05 | End: 2019-12-11 | Stop reason: SDUPTHER

## 2019-09-05 RX ORDER — LEVOTHYROXINE SODIUM 88 MCG
TABLET ORAL
Qty: 60 TABLET | Refills: 2 | Status: SHIPPED | OUTPATIENT
Start: 2019-09-05 | End: 2019-09-05 | Stop reason: SDUPTHER

## 2019-12-06 ENCOUNTER — OFFICE VISIT (OUTPATIENT)
Dept: INTERNAL MEDICINE | Age: 39
End: 2019-12-06

## 2019-12-06 VITALS
TEMPERATURE: 97.2 F | WEIGHT: 135 LBS | BODY MASS INDEX: 21.69 KG/M2 | DIASTOLIC BLOOD PRESSURE: 52 MMHG | OXYGEN SATURATION: 99 % | HEART RATE: 66 BPM | SYSTOLIC BLOOD PRESSURE: 94 MMHG | HEIGHT: 66 IN

## 2019-12-06 DIAGNOSIS — D51.0 PERNICIOUS ANEMIA: Chronic | ICD-10-CM

## 2019-12-06 DIAGNOSIS — E89.0 POSTABLATIVE HYPOTHYROIDISM: Primary | Chronic | ICD-10-CM

## 2019-12-06 DIAGNOSIS — R53.83 FATIGUE, UNSPECIFIED TYPE: Chronic | ICD-10-CM

## 2019-12-06 PROCEDURE — 99214 OFFICE O/P EST MOD 30 MIN: CPT | Performed by: INTERNAL MEDICINE

## 2019-12-06 RX ORDER — ASCORBIC ACID 500 MG
1 TABLET ORAL DAILY
COMMUNITY
End: 2020-07-17 | Stop reason: ALTCHOICE

## 2019-12-06 RX ORDER — VALACYCLOVIR HYDROCHLORIDE 500 MG/1
500 TABLET, FILM COATED ORAL
COMMUNITY
End: 2020-07-17 | Stop reason: ALTCHOICE

## 2019-12-06 NOTE — PATIENT INSTRUCTIONS
** IMPORTANT MESSAGE FROM DR. CASTREJON **    In our office, your satisfaction is VERY important to us.     You may receive a survey from [facility] by mail or E-mail for you to provide feedback about your visit. This information is invaluable for me to know what we can do to improve our services.     I ask that you please take a few minutes to complete the survey and let us know how we are doing in serving your needs. (You may receive the survey more than once for multiple visits)    Thank You !    Dr. Castrejon & Staff    __________________________________________________________      ADDITIONAL INSTRUCTION / REMINDERS FROM DR. CASTREJON

## 2019-12-06 NOTE — PROGRESS NOTES
"[facility] [place] INTERNAL MEDICINE  SUSAN CASTREJON M.D.      Renetta TRAN Eleanor / 39 y.o. / female  12/06/2019      CHIEF COMPLAINT     Hypothyroidism (f/u, labs)      ASSESSMENT & PLAN     Problem List Items Addressed This Visit        High    Hypothyroidism - Primary (Chronic)    Overview     H/o Grave's Dz s/p Rad I ablation         Current Assessment & Plan     Previously decreased dose of Cytomel.   Check labs today including FT3.          Relevant Medications    SYNTHROID 88 MCG tablet    liothyronine (CYTOMEL) 5 MCG tablet    Other Relevant Orders    TSH+Free T4    T3, Free       Medium    Pernicious anemia (Chronic)    Current Assessment & Plan     Continue B12 injections. Check complete blood count.          Relevant Medications    Syringe 23G X 1\" 3 ML misc    cyanocobalamin 1000 MCG/ML injection    Other Relevant Orders    CBC & Differential       Low    Fatigue (Chronic)    Current Assessment & Plan     Nonspecific. Could consider trial of increasing B12 to twice monthly to see if this helps.              Orders Placed This Encounter   Procedures   • TSH+Free T4   • T3, Free   • CBC & Differential     No orders of the defined types were placed in this encounter.      Summary/Discussion:  ·       Next Appointment with me: Visit date not found    Return in about 6 months (around 6/6/2020) for Annual physical, Hypothyroidism.      MEDICATIONS     Current Outpatient Medications   Medication Sig Dispense Refill   • calcium carbonate-vitamin d (CALTRATE 600+D) 600-400 MG-UNIT per tablet Take 1 tablet by mouth daily.     • cetirizine (zyrTEC) 10 MG tablet Take 10 mg by mouth Daily.     • cyanocobalamin 1000 MCG/ML injection Inject 1 mL into the appropriate muscle as directed by prescriber Every 28 (Twenty-Eight) Days. 25 mL 2   • liothyronine (CYTOMEL) 5 MCG tablet Take 1 tablet by mouth Every Morning. 30 tablet 2   • Multiple Vitamin (MULTIVITAMIN) tablet tablet Take 1 tablet by mouth Daily.     • SYNTHROID 88 MCG tablet TAKE 2 " "TABS IN THE AM ON EMPTY STOMACH 1 HOUR BEFORE EATING/DRINKING. 60 tablet 2   • Syringe 23G X 1\" 3 ML misc Use once a month with B12. 100 each 12   • azelastine (ASTELIN) 0.1 % nasal spray 2 sprays into each nostril 2 (two) times a day. Use in each nostril as directed 1 each 5   • norgestimate-ethinyl estradiol (TRI-SPRINTEC) 0.18/0.215/0.25 MG-35 MCG per tablet Take 1 tablet by mouth.       No current facility-administered medications for this visit.           VITAL SIGNS     Visit Vitals  BP 94/52   Pulse 66   Temp 97.2 °F (36.2 °C)   Ht 167.6 cm (66\")   Wt 61.2 kg (135 lb)   LMP 12/05/2019 (Exact Date)   SpO2 99%   BMI 21.79 kg/m²       BP Readings from Last 3 Encounters:   12/06/19 94/52   07/09/18 112/72   12/14/17 112/72     Wt Readings from Last 3 Encounters:   12/06/19 61.2 kg (135 lb)   07/09/18 61.2 kg (135 lb)   12/14/17 59 kg (130 lb)      Body mass index is 21.79 kg/m².      HISTORY OF PRESENT ILLNESS     Fatigue, nonspecific. No weight change. Previously decreased dose of Cytomel.   On B12 injection monthly for pernicious anemia. Inquires about taking B12 shot twice monthly for fatigue.     Post-ablative hypothyroidism (for Grave's) on Synthroid and Cytomel. No cold intolerance, weight change or bowel issues. Denies depression or increased anxiety.   Lab Results   Component Value Date    TSH 1.770 08/30/2019    TSH 7.210 (H) 10/17/2018    TSH 12.820 (H) 08/16/2018    FREET4 1.73 (H) 08/30/2019    FREET4 1.22 10/17/2018    FREET4 1.42 08/16/2018     Pernicious anemia on B12 replacement.   Lab Results   Component Value Date    WBC 2.83 (L) 07/09/2018    HGB 13.9 07/09/2018    HCT 41.6 07/09/2018    MCV 96.7 07/09/2018     07/09/2018          Patient Care Team:  Óscar Greene MD as PCP - General  Tiffany Goff MD as Consulting Physician (Obstetrics and Gynecology)      REVIEW OF SYSTEMS     Review of Systems  Constitutional neg  Resp neg  CV neg  GI neg  Psychiatrist neg  Neuro neg     PHYSICAL " EXAMINATION     Physical Exam  Constitutional  No distress  Cardiovascular Rate  normal . Rhythm: regular . Heart sounds:  Normal  Pulmonary/Chest: Effort normal and breath sounds normal.    Psychiatric  Alert. Judgment intact. Thought content normal. Mood normal      REVIEWED DATA     Labs:     Lab Results   Component Value Date     07/09/2018    K 4.7 07/09/2018    CALCIUM 9.6 07/09/2018    AST 24 07/09/2018    ALT 13 07/09/2018    BUN 20 07/09/2018    CREATININE 1.06 (H) 07/09/2018    CREATININE 0.84 12/14/2017    CREATININE 0.97 06/13/2016    EGFRIFNONA 58 (L) 07/09/2018    EGFRIFAFRI 70 07/09/2018       Lab Results   Component Value Date    GLU 68 07/09/2018    GLU 79 12/14/2017    GLU 70 06/13/2016       Lab Results   Component Value Date     (H) 07/09/2018    HDL 57 07/09/2018    TRIG 59 07/09/2018    CHOLHDLRATIO 3.12 07/09/2018       Lab Results   Component Value Date    TSH 1.770 08/30/2019    FREET4 1.73 (H) 08/30/2019       Lab Results   Component Value Date    WBC 2.83 (L) 07/09/2018    HGB 13.9 07/09/2018    HGB 13.4 12/14/2017    HGB 12.8 12/15/2016     07/09/2018       No results found for: PROTEIN, GLUCOSEU, BLOODU, NITRITEU, LEUKOCYTESUR    Imaging:         Medical Tests:         Summary of old records / correspondence / consultant report:         Request outside records:         ALLERGIES  Allergies   Allergen Reactions   • Cefprozil Other (See Comments) and Nausea And Vomiting     Yeast infection        PFSH:     The following portions of the patient's history were reviewed and updated as appropriate: Allergies / Current Medications / Past Medical History / Surgical History / Social History / Family History    PROBLEM LIST   Patient Active Problem List   Diagnosis   • Hypothyroidism   • Pernicious anemia   • Allergic rhinitis due to pollen   • Fatigue       PAST MEDICAL HISTORY  Past Medical History:   Diagnosis Date   • Hypothyroidism        SURGICAL HISTORY  Past Surgical  History:   Procedure Laterality Date   • BREAST AUGMENTATION         SOCIAL HISTORY  Social History     Socioeconomic History   • Marital status:      Spouse name: Not on file   • Number of children: Not on file   • Years of education: Not on file   • Highest education level: Not on file   Tobacco Use   • Smoking status: Never Smoker   • Smokeless tobacco: Never Used   Substance and Sexual Activity   • Alcohol use: No       FAMILY HISTORY  Family History   Problem Relation Age of Onset   • Diabetes Father    • Cancer Paternal Aunt    • Cancer Paternal Uncle    • Cancer Paternal Grandmother          **Dragon Disclaimer:   Much of this encounter note is an electronic transcription/translation of spoken language to printed text. The electronic translation of spoken language may permit erroneous, or at times, nonsensical words or phrases to be inadvertently transcribed. Although I have reviewed the note for such errors, some may still exist.       Template created by Guzman Greene MD

## 2019-12-07 LAB
BASOPHILS # BLD AUTO: 0.01 10*3/MM3 (ref 0–0.2)
BASOPHILS NFR BLD AUTO: 0.2 % (ref 0–1.5)
EOSINOPHIL # BLD AUTO: 0.07 10*3/MM3 (ref 0–0.4)
EOSINOPHIL NFR BLD AUTO: 1.5 % (ref 0.3–6.2)
ERYTHROCYTE [DISTWIDTH] IN BLOOD BY AUTOMATED COUNT: 12 % (ref 12.3–15.4)
HCT VFR BLD AUTO: 40.8 % (ref 34–46.6)
HGB BLD-MCNC: 13.8 G/DL (ref 12–15.9)
IMM GRANULOCYTES # BLD AUTO: 0.01 10*3/MM3 (ref 0–0.05)
IMM GRANULOCYTES NFR BLD AUTO: 0.2 % (ref 0–0.5)
LYMPHOCYTES # BLD AUTO: 1.12 10*3/MM3 (ref 0.7–3.1)
LYMPHOCYTES NFR BLD AUTO: 24.6 % (ref 19.6–45.3)
MCH RBC QN AUTO: 32.3 PG (ref 26.6–33)
MCHC RBC AUTO-ENTMCNC: 33.8 G/DL (ref 31.5–35.7)
MCV RBC AUTO: 95.6 FL (ref 79–97)
MONOCYTES # BLD AUTO: 0.32 10*3/MM3 (ref 0.1–0.9)
MONOCYTES NFR BLD AUTO: 7 % (ref 5–12)
NEUTROPHILS # BLD AUTO: 3.03 10*3/MM3 (ref 1.7–7)
NEUTROPHILS NFR BLD AUTO: 66.5 % (ref 42.7–76)
NRBC BLD AUTO-RTO: 0 /100 WBC (ref 0–0.2)
PLATELET # BLD AUTO: 226 10*3/MM3 (ref 140–450)
RBC # BLD AUTO: 4.27 10*6/MM3 (ref 3.77–5.28)
T3FREE SERPL-MCNC: 3.1 PG/ML (ref 2–4.4)
T4 FREE SERPL-MCNC: 1.62 NG/DL (ref 0.93–1.7)
TSH SERPL DL<=0.005 MIU/L-ACNC: 7.31 UIU/ML (ref 0.27–4.2)
WBC # BLD AUTO: 4.56 10*3/MM3 (ref 3.4–10.8)

## 2019-12-11 ENCOUNTER — TELEPHONE (OUTPATIENT)
Dept: INTERNAL MEDICINE | Age: 39
End: 2019-12-11

## 2019-12-11 DIAGNOSIS — E89.0 POSTABLATIVE HYPOTHYROIDISM: Chronic | ICD-10-CM

## 2019-12-11 DIAGNOSIS — E53.8 B12 DEFICIENCY: ICD-10-CM

## 2019-12-11 DIAGNOSIS — D51.0 PERNICIOUS ANEMIA: Chronic | ICD-10-CM

## 2019-12-11 DIAGNOSIS — E03.9 ACQUIRED HYPOTHYROIDISM: ICD-10-CM

## 2019-12-11 RX ORDER — LIOTHYRONINE SODIUM 5 UG/1
5 TABLET ORAL EVERY MORNING
Qty: 30 TABLET | Refills: 5 | Status: SHIPPED | OUTPATIENT
Start: 2019-12-11 | End: 2020-03-19 | Stop reason: SDUPTHER

## 2019-12-11 RX ORDER — SYRINGE W-NEEDLE,DISPOSAB,3 ML 25GX5/8"
SYRINGE, EMPTY DISPOSABLE MISCELLANEOUS
Qty: 100 EACH | Refills: 12 | Status: SHIPPED | OUTPATIENT
Start: 2019-12-11 | End: 2020-07-17 | Stop reason: ALTCHOICE

## 2019-12-11 RX ORDER — LEVOTHYROXINE SODIUM 88 MCG
TABLET ORAL
Qty: 60 TABLET | Refills: 5 | Status: SHIPPED | OUTPATIENT
Start: 2019-12-11 | End: 2020-03-19

## 2019-12-11 RX ORDER — CYANOCOBALAMIN 1000 UG/ML
1000 INJECTION, SOLUTION INTRAMUSCULAR; SUBCUTANEOUS
Qty: 1 ML | Refills: 5 | Status: SHIPPED | OUTPATIENT
Start: 2019-12-11 | End: 2020-07-17 | Stop reason: ALTCHOICE

## 2019-12-11 NOTE — TELEPHONE ENCOUNTER
Pt is requesting rx refills on the following:    Liothyronine 5mcg  Synthroid 88mcg  Cyanocobalamin 1000mcg/mL injection    [facility] #[phone].

## 2020-02-20 DIAGNOSIS — E89.0 POSTABLATIVE HYPOTHYROIDISM: Primary | Chronic | ICD-10-CM

## 2020-03-04 ENCOUNTER — RESULTS ENCOUNTER (OUTPATIENT)
Dept: INTERNAL MEDICINE | Age: 40
End: 2020-03-04

## 2020-03-04 DIAGNOSIS — E89.0 POSTABLATIVE HYPOTHYROIDISM: Chronic | ICD-10-CM

## 2020-03-06 LAB
T4 FREE SERPL-MCNC: 2.14 NG/DL (ref 0.93–1.7)
TSH SERPL DL<=0.005 MIU/L-ACNC: 2.93 UIU/ML (ref 0.27–4.2)

## 2020-03-08 NOTE — PROGRESS NOTES
Call with results:    IMPORTANT:  Thyroid level is all over the place. See me this/next week to review and adjust therapy.

## 2020-03-12 ENCOUNTER — OFFICE VISIT (OUTPATIENT)
Dept: INTERNAL MEDICINE | Age: 40
End: 2020-03-12

## 2020-03-12 VITALS
OXYGEN SATURATION: 98 % | HEIGHT: 66 IN | SYSTOLIC BLOOD PRESSURE: 90 MMHG | HEART RATE: 73 BPM | TEMPERATURE: 98.8 F | WEIGHT: 136 LBS | DIASTOLIC BLOOD PRESSURE: 58 MMHG | BODY MASS INDEX: 21.86 KG/M2

## 2020-03-12 DIAGNOSIS — Z3A.01 LESS THAN 8 WEEKS GESTATION OF PREGNANCY: ICD-10-CM

## 2020-03-12 DIAGNOSIS — E89.0 POSTABLATIVE HYPOTHYROIDISM: Primary | Chronic | ICD-10-CM

## 2020-03-12 DIAGNOSIS — D51.0 PERNICIOUS ANEMIA: Chronic | ICD-10-CM

## 2020-03-12 PROCEDURE — 99214 OFFICE O/P EST MOD 30 MIN: CPT | Performed by: INTERNAL MEDICINE

## 2020-03-12 NOTE — PATIENT INSTRUCTIONS
** IMPORTANT MESSAGE FROM DR. CASTREJON **    In our office, your satisfaction is VERY important to us.     You may receive a survey from [facility] by mail or E-mail for you to provide feedback about your visit. This information is invaluable for me to know what we can do to improve our services.     I ask that you please take a few minutes to complete the survey and let us know how we are doing in serving your needs. (You may receive the survey more than once for multiple visits)    Thank You !    Dr. Castrejon & Staff    _________________________________________________________________________________________________________________________      ** ADDITIONAL INSTRUCTION / REMINDERS FROM DR. CASTREJON **

## 2020-03-12 NOTE — PROGRESS NOTES
"[facility] [place] [facility] [place]  SUSAN CASTREJON M.D.      Renetta TRAN Eleanor / 39 y.o. / female  03/12/2020      CHIEF COMPLAINT     Hypothyroidism (labs)       ASSESSMENT & PLAN     1. Postablative hypothyroidism  History of Rad I ablation for Grave's disease    2. Less than 8 weeks gestation of pregnancy  Has appointment to see OB/gyne tomorrow    3. Pernicious anemia  - continue B12 injections    Summary/Discussion:  Her thyroid level has been difficult to maintain over the years.   She is now about 6 weeks pregnant and last thyroid levels show TSH close to 3 with elevated FT4 > 2.   Clinically she appears euthyroid.   Will refer her back to endocrine for management for her thyroid condition through pregnancy.   She should be seen no later than next week as she is pregnant in 1st Trimester.     Next Appointment with me: 7/2/2020    Return for Next scheduled follow up.      VITAL SIGNS     Visit Vitals  BP 90/58   Pulse 73   Temp 98.8 °F (37.1 °C)   Ht 167.6 cm (66\")   Wt 61.7 kg (136 lb)   LMP 01/27/2020   SpO2 98%   BMI 21.95 kg/m²       BP Readings from Last 3 Encounters:   03/12/20 90/58   12/06/19 94/52   07/09/18 112/72     Wt Readings from Last 3 Encounters:   03/12/20 61.7 kg (136 lb)   12/06/19 61.2 kg (135 lb)   07/09/18 61.2 kg (135 lb)      Body mass index is 21.95 kg/m².      HISTORY OF PRESENT ILLNESS      She found out she is about 6 weeks pregnant.   She has history of Grave's s/p Rad I ablation many years ago. She also has pernicious anemia.   She is on Synthroid 88 mcg TWO daily along with Cytomel 5 mcg daily.   Most recent labs:   Lab Results   Component Value Date    TSH 2.930 03/06/2020    TSH 7.310 (H) 12/06/2019    TSH 1.770 08/30/2019    FREET4 2.14 (H) 03/06/2020    FREET4 1.62 12/06/2019    FREET4 1.73 (H) 08/30/2019      She feels little more fatigued but denies hot/cold intolerance, constipation/diarrhea, palpitations/anxiety.     Patient Care Team:  Óscar Castrejon MD as PCP - Tiffany Boston MD " as Consulting Physician (Obstetrics and Gynecology)      REVIEW OF SYSTEMS     Review of Systems  Constitutional neg for significant change  Resp neg  CV neg  GI neg  Psych neg     PHYSICAL EXAMINATION     Physical Exam  Constitutional  No distress  Cardiovascular Rate  normal . Rhythm: regular . Heart sounds:  Normal  Psychiatric  Alert. Judgment intact. Thought content normal. Mood normal    REVIEWED DATA     Labs:     Lab Results   Component Value Date     07/09/2018    K 4.7 07/09/2018    CALCIUM 9.6 07/09/2018    AST 24 07/09/2018    ALT 13 07/09/2018    BUN 20 07/09/2018    CREATININE 1.06 (H) 07/09/2018    CREATININE 0.84 12/14/2017    CREATININE 0.97 06/13/2016    EGFRIFNONA 58 (L) 07/09/2018    EGFRIFAFRI 70 07/09/2018       Lab Results   Component Value Date    GLU 68 07/09/2018    GLU 79 12/14/2017    GLU 70 06/13/2016       Lab Results   Component Value Date     (H) 07/09/2018    HDL 57 07/09/2018    TRIG 59 07/09/2018    CHOLHDLRATIO 3.12 07/09/2018       Lab Results   Component Value Date    TSH 2.930 03/06/2020    FREET4 2.14 (H) 03/06/2020       Lab Results   Component Value Date    WBC 4.56 12/06/2019    HGB 13.8 12/06/2019    HGB 13.9 07/09/2018    HGB 13.4 12/14/2017     12/06/2019        No results found for: PROTEIN, GLUCOSEU, BLOODU, NITRITEU, LEUKOCYTESUR    Imaging:         Medical Tests:         Summary of old records / correspondence / consultant report:         Request outside records:         ALLERGIES  Allergies   Allergen Reactions   • Cefprozil Other (See Comments) and Nausea And Vomiting     Yeast infection        MEDICATIONS  Current Outpatient Medications   Medication Sig Dispense Refill   • azelastine (ASTELIN) 0.1 % nasal spray 2 sprays into each nostril 2 (two) times a day. Use in each nostril as directed (Patient taking differently: 2 sprays into the nostril(s) as directed by provider 2 (Two) Times a Day. Use in each nostril as directed prn) 1 each 5   •  "calcium carbonate-vitamin d (CALTRATE 600+D) 600-400 MG-UNIT per tablet Take 1 tablet by mouth daily.     • cetirizine (zyrTEC) 10 MG tablet Take 10 mg by mouth Daily As Needed.     • cyanocobalamin 1000 MCG/ML injection Inject 1 mL into the appropriate muscle as directed by prescriber Every 28 (Twenty-Eight) Days. 1 mL 5   • liothyronine (CYTOMEL) 5 MCG tablet Take 1 tablet by mouth Every Morning. 30 tablet 5   • Multiple Vitamin (MULTIVITAMIN) tablet tablet Take 1 tablet by mouth Daily.     • SYNTHROID 88 MCG tablet TAKE 2 TABS IN THE AM ON EMPTY STOMACH 1 HOUR BEFORE EATING/DRINKING. 60 tablet 5   • Syringe 23G X 1\" 3 ML misc Use once a month with B12. 100 each 12   • valACYclovir (VALTREX) 500 MG tablet Take 500 mg by mouth.       No current facility-administered medications for this visit.        PFSH:     The following portions of the patient's history were reviewed and updated as appropriate: Allergies / Current Medications / Past Medical History / Surgical History / Social History / Family History    PROBLEM LIST   Patient Active Problem List   Diagnosis   • Hypothyroidism   • Pernicious anemia   • Allergic rhinitis due to pollen   • Fatigue       PAST MEDICAL HISTORY  Past Medical History:   Diagnosis Date   • Hypothyroidism        SURGICAL HISTORY  Past Surgical History:   Procedure Laterality Date   • BREAST AUGMENTATION         SOCIAL HISTORY  Social History     Socioeconomic History   • Marital status:      Spouse name: Not on file   • Number of children: Not on file   • Years of education: Not on file   • Highest education level: Not on file   Tobacco Use   • Smoking status: Never Smoker   • Smokeless tobacco: Never Used   Substance and Sexual Activity   • Alcohol use: No       FAMILY HISTORY  Family History   Problem Relation Age of Onset   • Diabetes Father    • Cancer Paternal Aunt    • Cancer Paternal Uncle    • Cancer Paternal Grandmother            **Nadia Disclaimer:   Much of this " encounter note is an electronic transcription/translation of spoken language to printed text. The electronic translation of spoken language may permit erroneous, or at times, nonsensical words or phrases to be inadvertently transcribed. Although I have reviewed the note for such errors, some may still exist.     Template created by Guzman Greene MD

## 2020-03-16 ENCOUNTER — OFFICE VISIT (OUTPATIENT)
Dept: ENDOCRINOLOGY | Age: 40
End: 2020-03-16

## 2020-03-16 VITALS
HEIGHT: 66 IN | WEIGHT: 135.6 LBS | SYSTOLIC BLOOD PRESSURE: 116 MMHG | BODY MASS INDEX: 21.79 KG/M2 | RESPIRATION RATE: 16 BRPM | DIASTOLIC BLOOD PRESSURE: 66 MMHG

## 2020-03-16 DIAGNOSIS — Z3A.01 LESS THAN 8 WEEKS GESTATION OF PREGNANCY: ICD-10-CM

## 2020-03-16 DIAGNOSIS — R53.82 CHRONIC FATIGUE: ICD-10-CM

## 2020-03-16 DIAGNOSIS — E03.8 HYPOTHYROIDISM DUE TO HASHIMOTO'S THYROIDITIS: Primary | Chronic | ICD-10-CM

## 2020-03-16 DIAGNOSIS — E06.3 HYPOTHYROIDISM DUE TO HASHIMOTO'S THYROIDITIS: Primary | Chronic | ICD-10-CM

## 2020-03-16 PROCEDURE — 99204 OFFICE O/P NEW MOD 45 MIN: CPT | Performed by: INTERNAL MEDICINE

## 2020-03-16 NOTE — PROGRESS NOTES
"Placido Webster is a 39 y.o. female seen as a new patient for hypothyroidism. She states that she is 7 weeks pregnant but was told she is measuring 5.5 weeks. She is scheduled to go back to her OBGYN for another ultrasound Friday due to not finding a heartbeat on her last one. She denies any other problems or concerns.    History of Present Illness this is a 39-year-old female has been known to be hypothyroid for a long time and in fact since teenages.  She is currently on 176 mcg Synthroid and 5 mg Cytomel per day.  She does not recall for how long she has been on this dose.  She has learned that after missing herperiod about couple of weeks ago that she was 5 to 6 weeks pregnant however on her last visit to her obstetrician she has learned that there is also a possibility of a miscarriage.  She already has a healthy 6-year-old boy with no problem or complication during the first pregnancy.     /66   Resp 16   Ht 167.6 cm (66\")   Wt 61.5 kg (135 lb 9.6 oz)   LMP 01/27/2020   BMI 21.89 kg/m²      Allergies   Allergen Reactions   • Cefprozil Other (See Comments) and Nausea And Vomiting     Yeast infection       Current Outpatient Medications:   •  azelastine (ASTELIN) 0.1 % nasal spray, 2 sprays into each nostril 2 (two) times a day. Use in each nostril as directed (Patient taking differently: 2 sprays into the nostril(s) as directed by provider 2 (Two) Times a Day. Use in each nostril as directed prn), Disp: 1 each, Rfl: 5  •  calcium carbonate-vitamin d (CALTRATE 600+D) 600-400 MG-UNIT per tablet, Take 1 tablet by mouth daily., Disp: , Rfl:   •  cetirizine (zyrTEC) 10 MG tablet, Take 10 mg by mouth Daily As Needed., Disp: , Rfl:   •  cyanocobalamin 1000 MCG/ML injection, Inject 1 mL into the appropriate muscle as directed by prescriber Every 28 (Twenty-Eight) Days., Disp: 1 mL, Rfl: 5  •  liothyronine (CYTOMEL) 5 MCG tablet, Take 1 tablet by mouth Every Morning., Disp: 30 tablet, Rfl: 5  •  " "Multiple Vitamin (MULTIVITAMIN) tablet tablet, Take 1 tablet by mouth Daily., Disp: , Rfl:   •  SYNTHROID 88 MCG tablet, TAKE 2 TABS IN THE AM ON EMPTY STOMACH 1 HOUR BEFORE EATING/DRINKING., Disp: 60 tablet, Rfl: 5  •  Syringe 23G X 1\" 3 ML misc, Use once a month with B12., Disp: 100 each, Rfl: 12  •  valACYclovir (VALTREX) 500 MG tablet, Take 500 mg by mouth., Disp: , Rfl:       The following portions of the patient's history were reviewed and updated as appropriate: allergies, current medications, past family history, past medical history, past social history, past surgical history and problem list.    Review of Systems   Constitutional: Negative.    HENT: Negative.    Eyes: Negative.    Respiratory: Negative.    Cardiovascular: Negative.    Gastrointestinal: Negative.    Endocrine: Negative.    Genitourinary: Negative.    Musculoskeletal: Negative.    Skin: Negative.    Allergic/Immunologic: Negative.    Neurological: Negative.    Hematological: Negative.    Psychiatric/Behavioral: Negative.        Objective   Physical Exam   Constitutional: She is oriented to person, place, and time. She appears well-developed and well-nourished. No distress.   HENT:   Head: Normocephalic and atraumatic.   Right Ear: External ear normal.   Left Ear: External ear normal.   Nose: Nose normal.   Mouth/Throat: Oropharynx is clear and moist. No oropharyngeal exudate.   Eyes: Pupils are equal, round, and reactive to light. Conjunctivae and EOM are normal. Right eye exhibits no discharge. Left eye exhibits no discharge. No scleral icterus.   Neck: Normal range of motion. Neck supple. No JVD present. No tracheal deviation present. No thyromegaly present.   Cardiovascular: Normal rate, regular rhythm, normal heart sounds and intact distal pulses. Exam reveals no gallop and no friction rub.   No murmur heard.  Pulmonary/Chest: Effort normal and breath sounds normal. No stridor. No respiratory distress. She has no wheezes. She has no " rales. She exhibits no tenderness.   Abdominal: Soft. Bowel sounds are normal. She exhibits no distension and no mass. There is no tenderness. There is no rebound and no guarding. No hernia.   Musculoskeletal: Normal range of motion. She exhibits no edema, tenderness or deformity.   Lymphadenopathy:     She has no cervical adenopathy.   Neurological: She is alert and oriented to person, place, and time. She displays normal reflexes. No cranial nerve deficit or sensory deficit. She exhibits normal muscle tone. Coordination normal.   Skin: Skin is warm and dry. No rash noted. She is not diaphoretic. No erythema. No pallor.   Psychiatric: She has a normal mood and affect. Her behavior is normal. Judgment and thought content normal.   Nursing note and vitals reviewed.        Assessment/Plan   Renetta was seen today for thyroid problem.    Diagnoses and all orders for this visit:    Hypothyroidism due to Hashimoto's thyroiditis  -     T3, Free  -     T4 & TSH ([facility])  -     T4, Free  -     Thyroglobulin With Anti-TG  -     Vitamin D 25 Hydroxy  -     Comprehensive Metabolic Panel  -     C-Peptide  -     Hemoglobin A1c  -     hCG, Quantitative, Pregnancy  -     T4 & TSH ([facility]); Future  -     T3, Free; Future  -     T4, Free; Future  -     Thyroglobulin With Anti-TG; Future  -     Uric Acid; Future  -     Vitamin D 25 Hydroxy; Future  -     Comprehensive Metabolic Panel; Future  -     C-Peptide; Future  -     Hemoglobin A1c; Future  -     hCG, Quantitative, Pregnancy; Future  -     ACTH  -     Cortisol    Chronic fatigue  -     T3, Free  -     T4 & TSH ([facility])  -     T4, Free  -     Thyroglobulin With Anti-TG  -     Vitamin D 25 Hydroxy  -     Comprehensive Metabolic Panel  -     C-Peptide  -     Hemoglobin A1c  -     hCG, Quantitative, Pregnancy  -     T4 & TSH ([facility]); Future  -     T3, Free; Future  -     T4, Free; Future  -     Thyroglobulin With Anti-TG; Future  -     Uric Acid; Future  -     Vitamin D 25  Hydroxy; Future  -     Comprehensive Metabolic Panel; Future  -     C-Peptide; Future  -     Hemoglobin A1c; Future  -     hCG, Quantitative, Pregnancy; Future  -     ACTH  -     Cortisol    Less than 8 weeks gestation of pregnancy  -     T3, Free  -     T4 & TSH ([facility])  -     T4, Free  -     Thyroglobulin With Anti-TG  -     Vitamin D 25 Hydroxy  -     Comprehensive Metabolic Panel  -     C-Peptide  -     Hemoglobin A1c  -     hCG, Quantitative, Pregnancy  -     T4 & TSH ([facility]); Future  -     T3, Free; Future  -     T4, Free; Future  -     Thyroglobulin With Anti-TG; Future  -     Uric Acid; Future  -     Vitamin D 25 Hydroxy; Future  -     Comprehensive Metabolic Panel; Future  -     C-Peptide; Future  -     Hemoglobin A1c; Future  -     hCG, Quantitative, Pregnancy; Future  -     ACTH  -     Cortisol      In summary I saw and examined this 39-year-old female for above-mentioned problems.  I reviewed her laboratory evaluations as far back as 12/15/2016  Up to the most recent time being 3/6/2020 and at this point we will go ahead and order additional laboratory evaluation and once the results come back we will go ahead and call for any possible modification or new medications.  She will see Ms. Murielrafi Cruz in 3 months or sooner if needed with laboratory evaluation prior to each office visit.         Answers for HPI/ROS submitted by the patient on 3/16/2020   What is the primary reason for your visit?: Other  Please describe your symptoms.: Pregnant and my thyroid levels are out of balance. My PCP wanted me to have a consultation.  Have you had these symptoms before?: Yes  How long have you been having these symptoms?: Other  Please list any medications you are currently taking for this condition.: Synthroid 88mcg, 2per day in the morning, Liothyronine  5mcg one per day  Please describe any probable cause for these symptoms. : Graves disease treated with radioactive iodine circa 2001; hypothyroidism  since

## 2020-03-17 LAB
25(OH)D3+25(OH)D2 SERPL-MCNC: 40.9 NG/ML (ref 30–100)
ACTH PLAS-MCNC: 20.6 PG/ML (ref 7.2–63.3)
ALBUMIN SERPL-MCNC: 4.3 G/DL (ref 3.5–5.2)
ALBUMIN/GLOB SERPL: 1.7 G/DL
ALP SERPL-CCNC: 46 U/L (ref 39–117)
ALT SERPL-CCNC: 13 U/L (ref 1–33)
AST SERPL-CCNC: 15 U/L (ref 1–32)
BILIRUB SERPL-MCNC: 0.3 MG/DL (ref 0.2–1.2)
BUN SERPL-MCNC: 16 MG/DL (ref 6–20)
BUN/CREAT SERPL: 18.6 (ref 7–25)
C PEPTIDE SERPL-MCNC: 2 NG/ML (ref 1.1–4.4)
CALCIUM SERPL-MCNC: 9.4 MG/DL (ref 8.6–10.5)
CHLORIDE SERPL-SCNC: 101 MMOL/L (ref 98–107)
CO2 SERPL-SCNC: 26.2 MMOL/L (ref 22–29)
CORTIS SERPL-MCNC: 6.9 UG/DL
CREAT SERPL-MCNC: 0.86 MG/DL (ref 0.57–1)
GLOBULIN SER CALC-MCNC: 2.6 GM/DL
GLUCOSE SERPL-MCNC: 96 MG/DL (ref 65–99)
HBA1C MFR BLD: 5.9 % (ref 4.8–5.6)
POTASSIUM SERPL-SCNC: 4.5 MMOL/L (ref 3.5–5.2)
PROT SERPL-MCNC: 6.9 G/DL (ref 6–8.5)
SODIUM SERPL-SCNC: 137 MMOL/L (ref 136–145)
T3FREE SERPL-MCNC: 3.2 PG/ML (ref 2–4.4)
T4 FREE SERPL-MCNC: 2.04 NG/DL (ref 0.93–1.7)
T4 SERPL-MCNC: 11.3 MCG/DL (ref 4.5–11.7)
THYROGLOB AB SERPL-ACNC: <1 IU/ML (ref 0–0.9)
THYROGLOB SERPL-MCNC: 0.3 NG/ML (ref 1.5–38.5)
TSH SERPL DL<=0.005 MIU/L-ACNC: 0.64 UIU/ML (ref 0.27–4.2)

## 2020-03-18 LAB
HCG INTACT+B SERPL-ACNC: NORMAL MIU/ML
Lab: NORMAL
Lab: NORMAL
WRITTEN AUTHORIZATION: NORMAL

## 2020-03-19 DIAGNOSIS — E03.8 HYPOTHYROIDISM DUE TO HASHIMOTO'S THYROIDITIS: Primary | Chronic | ICD-10-CM

## 2020-03-19 DIAGNOSIS — R53.82 CHRONIC FATIGUE: Chronic | ICD-10-CM

## 2020-03-19 DIAGNOSIS — E06.3 HYPOTHYROIDISM DUE TO HASHIMOTO'S THYROIDITIS: Primary | Chronic | ICD-10-CM

## 2020-03-19 DIAGNOSIS — E03.9 ACQUIRED HYPOTHYROIDISM: ICD-10-CM

## 2020-03-19 RX ORDER — LIOTHYRONINE SODIUM 5 UG/1
5 TABLET ORAL EVERY MORNING
Qty: 30 TABLET | Refills: 11 | Status: SHIPPED | OUTPATIENT
Start: 2020-03-19 | End: 2020-07-17 | Stop reason: ALTCHOICE

## 2020-03-19 RX ORDER — LEVOTHYROXINE SODIUM 175 MCG
175 TABLET ORAL DAILY
Qty: 30 TABLET | Refills: 11 | Status: SHIPPED | OUTPATIENT
Start: 2020-03-19 | End: 2020-07-17

## 2020-05-15 ENCOUNTER — RESULTS ENCOUNTER (OUTPATIENT)
Dept: ENDOCRINOLOGY | Age: 40
End: 2020-05-15

## 2020-05-15 ENCOUNTER — LAB (OUTPATIENT)
Dept: ENDOCRINOLOGY | Age: 40
End: 2020-05-15

## 2020-05-15 DIAGNOSIS — Z3A.01 LESS THAN 8 WEEKS GESTATION OF PREGNANCY: ICD-10-CM

## 2020-05-15 DIAGNOSIS — E06.3 HYPOTHYROIDISM DUE TO HASHIMOTO'S THYROIDITIS: Chronic | ICD-10-CM

## 2020-05-15 DIAGNOSIS — E03.8 HYPOTHYROIDISM DUE TO HASHIMOTO'S THYROIDITIS: Chronic | ICD-10-CM

## 2020-05-15 DIAGNOSIS — R53.82 CHRONIC FATIGUE: ICD-10-CM

## 2020-05-15 DIAGNOSIS — R53.82 CHRONIC FATIGUE: Chronic | ICD-10-CM

## 2020-05-18 LAB
25(OH)D3+25(OH)D2 SERPL-MCNC: 41 NG/ML (ref 30–100)
ALBUMIN SERPL-MCNC: 4 G/DL (ref 3.5–5.2)
ALBUMIN/GLOB SERPL: 1.5 G/DL
ALP SERPL-CCNC: 44 U/L (ref 39–117)
ALT SERPL-CCNC: 11 U/L (ref 1–33)
AST SERPL-CCNC: 14 U/L (ref 1–32)
BILIRUB SERPL-MCNC: 0.3 MG/DL (ref 0.2–1.2)
BUN SERPL-MCNC: 15 MG/DL (ref 6–20)
BUN/CREAT SERPL: 20.8 (ref 7–25)
C PEPTIDE SERPL-MCNC: 1.2 NG/ML (ref 1.1–4.4)
CALCIUM SERPL-MCNC: 9.3 MG/DL (ref 8.6–10.5)
CHLORIDE SERPL-SCNC: 103 MMOL/L (ref 98–107)
CO2 SERPL-SCNC: 22.1 MMOL/L (ref 22–29)
CREAT SERPL-MCNC: 0.72 MG/DL (ref 0.57–1)
GLOBULIN SER CALC-MCNC: 2.6 GM/DL
GLUCOSE SERPL-MCNC: 81 MG/DL (ref 65–99)
HBA1C MFR BLD: 5 % (ref 4.8–5.6)
POTASSIUM SERPL-SCNC: 4.1 MMOL/L (ref 3.5–5.2)
PROT SERPL-MCNC: 6.6 G/DL (ref 6–8.5)
SODIUM SERPL-SCNC: 136 MMOL/L (ref 136–145)
T3FREE SERPL-MCNC: 2.4 PG/ML (ref 2–4.4)
T4 FREE SERPL-MCNC: 1.4 NG/DL (ref 0.93–1.7)
T4 SERPL-MCNC: 10.3 MCG/DL (ref 4.5–11.7)
THYROGLOB AB SERPL-ACNC: <1 IU/ML (ref 0–0.9)
THYROGLOB SERPL-MCNC: 0.5 NG/ML (ref 1.5–38.5)
TSH SERPL DL<=0.005 MIU/L-ACNC: 10.2 UIU/ML (ref 0.27–4.2)
URATE SERPL-MCNC: 3.9 MG/DL (ref 2.4–5.7)

## 2020-05-28 ENCOUNTER — TELEPHONE (OUTPATIENT)
Dept: ENDOCRINOLOGY | Age: 40
End: 2020-05-28

## 2020-06-11 DIAGNOSIS — Z00.00 PREVENTATIVE HEALTH CARE: Primary | ICD-10-CM

## 2020-06-22 ENCOUNTER — RESULTS ENCOUNTER (OUTPATIENT)
Dept: INTERNAL MEDICINE | Age: 40
End: 2020-06-22

## 2020-06-22 DIAGNOSIS — Z00.00 PREVENTATIVE HEALTH CARE: ICD-10-CM

## 2020-06-23 ENCOUNTER — TELEPHONE (OUTPATIENT)
Dept: ENDOCRINOLOGY | Age: 40
End: 2020-06-23

## 2020-06-23 NOTE — TELEPHONE ENCOUNTER
Patient left voice mail  She is pregnant    She states she was communication by my chart with mili on 05/28/2020  She had some kind of test taken on 05/15    She stated that dr garces was suppose to get back with her with what he wanted her to do and when to be seen       Patient can be reached at [phone]

## 2020-06-24 NOTE — TELEPHONE ENCOUNTER
Left patient a detailed voicemail asking for a return call to discuss. Left detailed message in regards to message from Dr. Paige.

## 2020-07-14 LAB
25(OH)D3+25(OH)D2 SERPL-MCNC: 52.9 NG/ML (ref 30–100)
ALBUMIN SERPL-MCNC: 3.6 G/DL (ref 3.8–4.8)
ALBUMIN/GLOB SERPL: 1.3 {RATIO} (ref 1.2–2.2)
ALP SERPL-CCNC: 61 IU/L (ref 39–117)
ALT SERPL-CCNC: 13 IU/L (ref 0–32)
AST SERPL-CCNC: 22 IU/L (ref 0–40)
BILIRUB SERPL-MCNC: 0.3 MG/DL (ref 0–1.2)
BUN SERPL-MCNC: 12 MG/DL (ref 6–24)
BUN/CREAT SERPL: 18 (ref 9–23)
C PEPTIDE SERPL-MCNC: 2.5 NG/ML (ref 1.1–4.4)
CALCIUM SERPL-MCNC: 9 MG/DL (ref 8.7–10.2)
CHLORIDE SERPL-SCNC: 103 MMOL/L (ref 96–106)
CO2 SERPL-SCNC: 21 MMOL/L (ref 20–29)
CREAT SERPL-MCNC: 0.65 MG/DL (ref 0.57–1)
GLOBULIN SER CALC-MCNC: 2.7 G/DL (ref 1.5–4.5)
GLUCOSE SERPL-MCNC: 75 MG/DL (ref 65–99)
HBA1C MFR BLD: 4.9 % (ref 4.8–5.6)
POTASSIUM SERPL-SCNC: 4.2 MMOL/L (ref 3.5–5.2)
PROT SERPL-MCNC: 6.3 G/DL (ref 6–8.5)
SODIUM SERPL-SCNC: 138 MMOL/L (ref 134–144)
T3FREE SERPL-MCNC: 2.3 PG/ML (ref 2–4.4)
T4 FREE SERPL-MCNC: 1.45 NG/DL (ref 0.82–1.77)
T4 SERPL-MCNC: 13 UG/DL (ref 4.5–12)
THYROGLOB AB SERPL-ACNC: <1 IU/ML (ref 0–0.9)
THYROGLOB SERPL-MCNC: 0.5 NG/ML (ref 1.5–38.5)
TSH SERPL DL<=0.005 MIU/L-ACNC: 4.6 UIU/ML (ref 0.45–4.5)

## 2020-07-17 ENCOUNTER — OFFICE VISIT (OUTPATIENT)
Dept: ENDOCRINOLOGY | Age: 40
End: 2020-07-17

## 2020-07-17 VITALS
DIASTOLIC BLOOD PRESSURE: 70 MMHG | HEIGHT: 66 IN | SYSTOLIC BLOOD PRESSURE: 116 MMHG | WEIGHT: 150.8 LBS | RESPIRATION RATE: 16 BRPM | BODY MASS INDEX: 24.23 KG/M2

## 2020-07-17 DIAGNOSIS — E06.3 HYPOTHYROIDISM DUE TO HASHIMOTO'S THYROIDITIS: Primary | Chronic | ICD-10-CM

## 2020-07-17 DIAGNOSIS — R53.82 CHRONIC FATIGUE: Chronic | ICD-10-CM

## 2020-07-17 DIAGNOSIS — E03.8 HYPOTHYROIDISM DUE TO HASHIMOTO'S THYROIDITIS: Primary | Chronic | ICD-10-CM

## 2020-07-17 PROCEDURE — 99214 OFFICE O/P EST MOD 30 MIN: CPT | Performed by: INTERNAL MEDICINE

## 2020-07-17 RX ORDER — LANOLIN ALCOHOL/MO/W.PET/CERES
CREAM (GRAM) TOPICAL
COMMUNITY

## 2020-07-17 RX ORDER — CHLORAL HYDRATE 500 MG
CAPSULE ORAL
COMMUNITY

## 2020-07-17 RX ORDER — LEVOTHYROXINE SODIUM 200 MCG
200 TABLET ORAL DAILY
Qty: 90 TABLET | Refills: 3 | Status: SHIPPED | OUTPATIENT
Start: 2020-07-17 | End: 2021-07-17

## 2020-07-17 NOTE — PROGRESS NOTES
"Placido Webster is a 39 y.o. female seen for follow up for hypothyroidism, lab review. Patient is 23 weeks pregnant. Patient denies any problems or concerns.     History of Present Illness this is a 39-year-old female known patient with hypothyroidism.  Over the course of last 3 months she has had no significant health problem for which to go to the ER or hospital.    /70   Resp 16   Ht 167.6 cm (66\")   Wt 68.4 kg (150 lb 12.8 oz)   LMP 01/27/2020   BMI 24.34 kg/m²      Allergies   Allergen Reactions   • Cefprozil Other (See Comments) and Nausea And Vomiting     Yeast infection       Current Outpatient Medications:   •  calcium carbonate-vitamin d (CALTRATE 600+D) 600-400 MG-UNIT per tablet, Take 1 tablet by mouth daily., Disp: , Rfl:   •  cetirizine (zyrTEC) 10 MG tablet, Take 10 mg by mouth Daily As Needed., Disp: , Rfl:   •  Ferrous Sulfate Dried (FERROUS SULFATE IRON PO), Take  by mouth., Disp: , Rfl:   •  folic acid (FOLVITE) 400 MCG tablet, Take  by mouth., Disp: , Rfl:   •  Omega-3 Fatty Acids (FISH OIL) 1000 MG capsule capsule, Take  by mouth., Disp: , Rfl:   •  SYNTHROID 175 MCG tablet, Take 1 tablet by mouth Daily., Disp: 30 tablet, Rfl: 11      The following portions of the patient's history were reviewed and updated as appropriate: allergies, current medications, past family history, past medical history, past social history, past surgical history and problem list.    Review of Systems   Constitutional: Negative.    HENT: Negative.    Eyes: Negative.    Respiratory: Negative.    Cardiovascular: Negative.    Gastrointestinal: Negative.    Endocrine: Negative.    Genitourinary: Negative.    Musculoskeletal: Negative.    Skin: Negative.    Allergic/Immunologic: Negative.    Neurological: Negative.    Hematological: Negative.    Psychiatric/Behavioral: Negative.        Objective   Physical Exam   Constitutional: She is oriented to person, place, and time. She appears well-developed " and well-nourished. No distress.   HENT:   Head: Normocephalic and atraumatic.   Right Ear: External ear normal.   Left Ear: External ear normal.   Nose: Nose normal.   Mouth/Throat: Oropharynx is clear and moist. No oropharyngeal exudate.   Eyes: Pupils are equal, round, and reactive to light. Conjunctivae and EOM are normal. Right eye exhibits no discharge. Left eye exhibits no discharge. No scleral icterus.   Neck: Normal range of motion. Neck supple. No JVD present. No tracheal deviation present. No thyromegaly present.   Cardiovascular: Normal rate, regular rhythm, normal heart sounds and intact distal pulses. Exam reveals no gallop and no friction rub.   No murmur heard.  Pulmonary/Chest: Effort normal and breath sounds normal. No stridor. No respiratory distress. She has no wheezes. She has no rales. She exhibits no tenderness.   Abdominal: Soft. Bowel sounds are normal. She exhibits no distension and no mass. There is no tenderness. There is no rebound and no guarding. No hernia.   More prominence of the abdomen due to pregnancy   Musculoskeletal: Normal range of motion. She exhibits no edema, tenderness or deformity.   Lymphadenopathy:     She has no cervical adenopathy.   Neurological: She is alert and oriented to person, place, and time. She displays normal reflexes. No cranial nerve deficit or sensory deficit. She exhibits normal muscle tone. Coordination normal.   Skin: Skin is warm and dry. No rash noted. She is not diaphoretic. No erythema. No pallor.   Psychiatric: She has a normal mood and affect. Her behavior is normal. Judgment and thought content normal.   Nursing note and vitals reviewed.       Results for orders placed or performed in visit on 05/15/20   T4 & TSH ([facility])   Result Value Ref Range    TSH 4.600 (H) 0.450 - 4.500 uIU/mL    T4, Total 13.0 (H) 4.5 - 12.0 ug/dL   T3, Free   Result Value Ref Range    T3, Free 2.3 2.0 - 4.4 pg/mL   T4, Free   Result Value Ref Range    Free T4 1.45  0.82 - 1.77 ng/dL   Thyroglobulin With Anti-TG   Result Value Ref Range    Thyroglobulin Ab <1.0 0.0 - 0.9 IU/mL   Vitamin D 25 Hydroxy   Result Value Ref Range    25 Hydroxy, Vitamin D 52.9 30.0 - 100.0 ng/mL   Comprehensive Metabolic Panel   Result Value Ref Range    Glucose 75 65 - 99 mg/dL    BUN 12 6 - 24 mg/dL    Creatinine 0.65 0.57 - 1.00 mg/dL    eGFR Non African Am 111 >59 mL/min/1.73    eGFR African Am 128 >59 mL/min/1.73    BUN/Creatinine Ratio 18 9 - 23    Sodium 138 134 - 144 mmol/L    Potassium 4.2 3.5 - 5.2 mmol/L    Chloride 103 96 - 106 mmol/L    Total CO2 21 20 - 29 mmol/L    Calcium 9.0 8.7 - 10.2 mg/dL    Total Protein 6.3 6.0 - 8.5 g/dL    Albumin 3.6 (L) 3.8 - 4.8 g/dL    Globulin 2.7 1.5 - 4.5 g/dL    A/G Ratio 1.3 1.2 - 2.2    Total Bilirubin 0.3 0.0 - 1.2 mg/dL    Alkaline Phosphatase 61 39 - 117 IU/L    AST (SGOT) 22 0 - 40 IU/L    ALT (SGPT) 13 0 - 32 IU/L   C-Peptide   Result Value Ref Range    C-Peptide 2.5 1.1 - 4.4 ng/mL   Hemoglobin A1c   Result Value Ref Range    Hemoglobin A1C 4.9 4.8 - 5.6 %   Thyroglobulin By RICARDO   Result Value Ref Range    THYROGLOBULIN BY RICARDO 0.5 (L) 1.5 - 38.5 ng/mL         Assessment/Plan   Renetta was seen today for hypothyroidism.    Diagnoses and all orders for this visit:    Hypothyroidism due to Hashimoto's thyroiditis  -     T3, Free; Future  -     T4 & TSH ([facility]); Future  -     T4, Free; Future  -     T3, Free; Future  -     T4, Free; Future  -     Thyroglobulin With Anti-TG; Future  -     T4 & TSH ([facility]); Future  -     Uric Acid; Future  -     Vitamin D 25 Hydroxy; Future  -     Comprehensive Metabolic Panel; Future  -     C-Peptide; Future  -     Hemoglobin A1c; Future  -     MicroAlbumin, Urine, Random - Urine, Clean Catch; Future  -     NMR LipoProfile; Future    Chronic fatigue  -     T3, Free; Future  -     T4 & TSH ([facility]); Future  -     T4, Free; Future  -     T3, Free; Future  -     T4, Free; Future  -     Thyroglobulin With Anti-TG;  Future  -     T4 & TSH ([facility]); Future  -     Uric Acid; Future  -     Vitamin D 25 Hydroxy; Future  -     Comprehensive Metabolic Panel; Future  -     C-Peptide; Future  -     Hemoglobin A1c; Future  -     MicroAlbumin, Urine, Random - Urine, Clean Catch; Future  -     NMR LipoProfile; Future    Other orders  -     SYNTHROID 200 MCG tablet; Take 1 tablet by mouth Daily.      In summary I saw and examined this 39-year-old female for above-mentioned problems.  I reviewed her laboratory evaluations of 7/10/2020 and provided her a hard copy of it.  Outside the fact that her TSH is elevated she is otherwise clinically and metabolically stable I am going to increase the dose of her Synthroid to 200 mcg daily.  I will see her in 6 months or sooner if needed however I will go ahead and order a thyroid function test in 2 months to verify the efficacy of her thyroid hormone replacement therapy.

## 2020-09-07 ENCOUNTER — RESULTS ENCOUNTER (OUTPATIENT)
Dept: ENDOCRINOLOGY | Age: 40
End: 2020-09-07

## 2020-09-07 DIAGNOSIS — E06.3 HYPOTHYROIDISM DUE TO HASHIMOTO'S THYROIDITIS: Chronic | ICD-10-CM

## 2020-09-07 DIAGNOSIS — R53.82 CHRONIC FATIGUE: Chronic | ICD-10-CM

## 2020-09-07 DIAGNOSIS — E03.8 HYPOTHYROIDISM DUE TO HASHIMOTO'S THYROIDITIS: Chronic | ICD-10-CM

## 2020-09-21 ENCOUNTER — LAB (OUTPATIENT)
Dept: ENDOCRINOLOGY | Age: 40
End: 2020-09-21

## 2020-09-21 DIAGNOSIS — Z3A.01 LESS THAN 8 WEEKS GESTATION OF PREGNANCY: ICD-10-CM

## 2020-09-21 DIAGNOSIS — E03.9 ACQUIRED HYPOTHYROIDISM: Primary | ICD-10-CM

## 2020-09-21 DIAGNOSIS — E03.8 HYPOTHYROIDISM DUE TO HASHIMOTO'S THYROIDITIS: Chronic | ICD-10-CM

## 2020-09-21 DIAGNOSIS — E03.9 ACQUIRED HYPOTHYROIDISM: ICD-10-CM

## 2020-09-21 DIAGNOSIS — R53.82 CHRONIC FATIGUE: Chronic | ICD-10-CM

## 2020-09-21 DIAGNOSIS — E06.3 HYPOTHYROIDISM DUE TO HASHIMOTO'S THYROIDITIS: Chronic | ICD-10-CM

## 2020-09-22 LAB
ALBUMIN SERPL-MCNC: 3.5 G/DL (ref 3.5–5.2)
ALBUMIN/GLOB SERPL: 1.5 G/DL
ALP SERPL-CCNC: 110 U/L (ref 39–117)
ALT SERPL-CCNC: 10 U/L (ref 1–33)
AST SERPL-CCNC: 16 U/L (ref 1–32)
BILIRUB SERPL-MCNC: 0.3 MG/DL (ref 0–1.2)
BUN SERPL-MCNC: 12 MG/DL (ref 6–20)
BUN/CREAT SERPL: 15 (ref 7–25)
CALCIUM SERPL-MCNC: 8.5 MG/DL (ref 8.6–10.5)
CHLORIDE SERPL-SCNC: 104 MMOL/L (ref 98–107)
CO2 SERPL-SCNC: 22.5 MMOL/L (ref 22–29)
CREAT SERPL-MCNC: 0.8 MG/DL (ref 0.57–1)
GLOBULIN SER CALC-MCNC: 2.4 GM/DL
GLUCOSE SERPL-MCNC: 80 MG/DL (ref 65–99)
POTASSIUM SERPL-SCNC: 4 MMOL/L (ref 3.5–5.2)
PROT SERPL-MCNC: 5.9 G/DL (ref 6–8.5)
SODIUM SERPL-SCNC: 138 MMOL/L (ref 136–145)
T3FREE SERPL-MCNC: 2.3 PG/ML (ref 2–4.4)
T4 FREE SERPL-MCNC: 1.82 NG/DL (ref 0.93–1.7)
TSH SERPL DL<=0.005 MIU/L-ACNC: 1.8 UIU/ML (ref 0.27–4.2)

## 2021-08-05 ENCOUNTER — TELEPHONE (OUTPATIENT)
Dept: INTERNAL MEDICINE | Age: 41
End: 2021-08-05

## 2021-08-23 ENCOUNTER — APPOINTMENT (OUTPATIENT)
Dept: WOMENS IMAGING | Facility: HOSPITAL | Age: 41
End: 2021-08-23

## 2021-08-23 PROCEDURE — 77067 SCR MAMMO BI INCL CAD: CPT | Performed by: RADIOLOGY

## 2021-08-23 PROCEDURE — 77063 BREAST TOMOSYNTHESIS BI: CPT | Performed by: RADIOLOGY

## 2021-09-02 DIAGNOSIS — Z00.00 PREVENTATIVE HEALTH CARE: Primary | ICD-10-CM

## 2021-09-24 ENCOUNTER — OFFICE VISIT (OUTPATIENT)
Dept: INTERNAL MEDICINE | Age: 41
End: 2021-09-24

## 2021-09-24 VITALS
HEIGHT: 66 IN | TEMPERATURE: 97.8 F | BODY MASS INDEX: 22.02 KG/M2 | HEART RATE: 78 BPM | OXYGEN SATURATION: 98 % | SYSTOLIC BLOOD PRESSURE: 100 MMHG | DIASTOLIC BLOOD PRESSURE: 62 MMHG | WEIGHT: 137 LBS

## 2021-09-24 DIAGNOSIS — Z00.00 ENCOUNTER FOR ANNUAL HEALTH EXAMINATION: Primary | ICD-10-CM

## 2021-09-24 DIAGNOSIS — E89.0 POSTABLATIVE HYPOTHYROIDISM: ICD-10-CM

## 2021-09-24 DIAGNOSIS — D51.0 PERNICIOUS ANEMIA: ICD-10-CM

## 2021-09-24 PROBLEM — Z3A.01 LESS THAN 8 WEEKS GESTATION OF PREGNANCY: Status: RESOLVED | Noted: 2020-03-16 | Resolved: 2021-09-24

## 2021-09-24 PROBLEM — R53.83 FATIGUE: Chronic | Status: RESOLVED | Noted: 2017-12-14 | Resolved: 2021-09-24

## 2021-09-24 PROCEDURE — 99214 OFFICE O/P EST MOD 30 MIN: CPT | Performed by: INTERNAL MEDICINE

## 2021-09-24 PROCEDURE — 90686 IIV4 VACC NO PRSV 0.5 ML IM: CPT | Performed by: INTERNAL MEDICINE

## 2021-09-24 PROCEDURE — 90471 IMMUNIZATION ADMIN: CPT | Performed by: INTERNAL MEDICINE

## 2021-09-24 PROCEDURE — 99396 PREV VISIT EST AGE 40-64: CPT | Performed by: INTERNAL MEDICINE

## 2021-09-24 RX ORDER — LEVOTHYROXINE SODIUM 175 UG/1
175 TABLET ORAL
Qty: 30 TABLET | Refills: 3 | Status: SHIPPED | OUTPATIENT
Start: 2021-09-24 | End: 2021-12-17

## 2021-09-24 RX ORDER — LEVOTHYROXINE SODIUM 0.2 MG/1
200 TABLET ORAL DAILY
COMMUNITY
Start: 2021-07-29 | End: 2021-09-24 | Stop reason: DRUGHIGH

## 2021-09-24 NOTE — ASSESSMENT & PLAN NOTE
Lab Results   Component Value Date    TSH 0.122 (L) 09/17/2021    TSH 1.800 09/21/2020    TSH 4.600 (H) 07/10/2020    FREET4 2.37 (H) 09/17/2021    FREET4 1.82 (H) 09/21/2020    FREET4 1.45 07/10/2020        Decrease Synthroid to 175 mcg. Recheck labs in 6 weeks.

## 2021-09-24 NOTE — PATIENT INSTRUCTIONS
** IMPORTANT MESSAGE FROM DR. CASTREJON **    In our office, your satisfaction is VERY important to us.     You may receive a survey from [facility] by mail or E-mail for you to provide feedback about your visit. This information is invaluable for me to know what we can do to improve our services.     I ask that you please take a few minutes to complete the survey and let us know how we are doing in serving your needs. (You may receive the survey more than once for multiple visits)    Thank You !    Dr. Castrejon & Staff    _________________________________________________________________________________________________________________________      ** ADDITIONAL INSTRUCTION / REMINDERS FROM DR. CASTREJON **

## 2021-09-24 NOTE — ASSESSMENT & PLAN NOTE
Has not been compliant with injections.   Resume at 1000 mcg weekly x 1 month, biweekly x 2 months, then monthly.   Prescription sent to pharmacy.

## 2021-09-24 NOTE — PROGRESS NOTES
"    I N T E R N A L  M E D I C I N E  J U N O H  K I M,  M D      ENCOUNTER DATE:  09/24/2021    Renetta TRAN Eleanor / 40 y.o. / female      CHIEF COMPLAINT     Annual Exam      VITALS     Visit Vitals  /62 (BP Location: Left arm)   Pulse 78   Temp 97.8 °F (36.6 °C)   Ht 167.6 cm (66\")   Wt 62.1 kg (137 lb)   LMP 09/11/2021 (Exact Date)   SpO2 98%   Breastfeeding No   BMI 22.11 kg/m²       BP Readings from Last 3 Encounters:   09/24/21 100/62   07/17/20 116/70   03/16/20 116/66     Wt Readings from Last 3 Encounters:   09/24/21 62.1 kg (137 lb)   07/17/20 68.4 kg (150 lb 12.8 oz)   03/16/20 61.5 kg (135 lb 9.6 oz)      Body mass index is 22.11 kg/m².    MEDICATIONS     Current Outpatient Medications on File Prior to Visit   Medication Sig Dispense Refill   • cetirizine (zyrTEC) 10 MG tablet Take 10 mg by mouth Daily As Needed.     • [DISCONTINUED] levothyroxine (SYNTHROID, LEVOTHROID) 200 MCG tablet Take 200 mcg by mouth Daily.     • calcium carbonate-vitamin d (CALTRATE 600+D) 600-400 MG-UNIT per tablet Take 1 tablet by mouth daily.     • Ferrous Sulfate Dried (FERROUS SULFATE IRON PO) Take  by mouth.     • folic acid (FOLVITE) 400 MCG tablet Take  by mouth.     • Omega-3 Fatty Acids (FISH OIL) 1000 MG capsule capsule Take  by mouth.       No current facility-administered medications on file prior to visit.         HISTORY OF PRESENT ILLNESS     Renetta presents for annual health maintenance visit.  Had 2nd child in October.   Has not been compliant with B12 injections for pernicious anemia.   On Synthroid 200 mcg daily. TSH is low and free T4 is high.    · General health: some medical problems  · Lifestyle:  · Attempting to lose weight?: No   · Diet: eats a well balanced, healthy diet  · Exercise: has not been as active recently  · Tobacco: Never used   · Alcohol: does not drink  · Work: Full-time  · Reproductive health:  · Sexually active?: Yes   · Sexual problems?: No problems  · Concern for STD?: No    · Sees " Gynecologist?: Yes   · Love/Postmenopausal?: No   · Domestic abuse concerns: No   · Depression Screening:      PHQ-2/PHQ-9 Depression Screening 2021   Little interest or pleasure in doing things 0   Feeling down, depressed, or hopeless 0   Total Score 0         PHQ-2: 0 (Not depressed)   PHQ-9: 0 (Negative screening for depression)    Patient Care Team:  Óscar Greene MD as PCP - General  Tiffany Goff MD as Consulting Physician (Obstetrics and Gynecology)      ALLERGIES  Allergies   Allergen Reactions   • Cefprozil Other (See Comments) and Nausea And Vomiting     Yeast infection        PFSH:     The following portions of the patient's history were reviewed and updated as appropriate: Allergies / Current Medications / Past Medical History / Surgical History / Social History / Family History    PROBLEM LIST   Patient Active Problem List   Diagnosis   • Hypothyroidism   • Pernicious anemia   • Allergic rhinitis due to pollen       PAST MEDICAL HISTORY  Past Medical History:   Diagnosis Date   • Hypothyroidism        SURGICAL HISTORY  Past Surgical History:   Procedure Laterality Date   • BREAST AUGMENTATION     •  SECTION  10/04/2020       SOCIAL HISTORY  Social History     Socioeconomic History   • Marital status:      Spouse name: Not on file   • Number of children: Not on file   • Years of education: Not on file   • Highest education level: Not on file   Tobacco Use   • Smoking status: Never Smoker   • Smokeless tobacco: Never Used   Substance and Sexual Activity   • Alcohol use: No       FAMILY HISTORY  Family History   Problem Relation Age of Onset   • Diabetes Father    • Cancer Paternal Aunt    • Cancer Paternal Uncle    • Cancer Paternal Grandmother        IMMUNIZATION HISTORY  Immunization History   Administered Date(s) Administered   • COVID-19 (PFIZER) 2021, 2021   • Flu Vaccine Quad PF >36MO 2020   • Flu Vaccine Split Quad 2020   • Tdap 2013         REVIEW  OF SYSTEMS     Review of Systems   Constitutional: Positive for fatigue. Negative for unexpected weight change.   HENT: Negative.    Eyes: Negative.    Respiratory: Negative.    Cardiovascular: Negative.    Gastrointestinal: Negative.    Endocrine: Negative.    Genitourinary: Negative.    Musculoskeletal: Negative.    Skin: Negative.    Allergic/Immunologic: Negative.    Neurological: Negative.    Hematological: Negative.    Psychiatric/Behavioral: Negative.          PHYSICAL EXAMINATION     Physical Exam  Constitutional:       General: She is not in acute distress.     Appearance: Normal appearance. She is well-developed.   HENT:      Head: Normocephalic and atraumatic.      Right Ear: Tympanic membrane, ear canal and external ear normal.      Left Ear: Tympanic membrane, ear canal and external ear normal.   Eyes:      General: No scleral icterus.     Conjunctiva/sclera: Conjunctivae normal.      Pupils: Pupils are equal, round, and reactive to light.   Neck:      Thyroid: No thyroid mass or thyromegaly.      Vascular: No carotid bruit.      Trachea: No tracheal deviation.   Cardiovascular:      Rate and Rhythm: Normal rate and regular rhythm.      Pulses: Normal pulses.      Heart sounds: Normal heart sounds.   Pulmonary:      Effort: Pulmonary effort is normal.      Breath sounds: Normal breath sounds.   Abdominal:      General: There is no distension.      Palpations: Abdomen is soft. There is no mass.      Tenderness: There is no abdominal tenderness.      Hernia: No hernia is present.   Genitourinary:     Comments: Deferred to gyne/by patient unless specified otherwise.   Musculoskeletal:         General: No deformity.      Cervical back: Normal range of motion and neck supple.      Right lower leg: No edema.      Left lower leg: No edema.   Skin:     General: Skin is warm.      Coloration: Skin is not jaundiced or pale.      Findings: No rash.   Neurological:      General: No focal deficit present.       Mental Status: She is alert and oriented to person, place, and time.      Cranial Nerves: No cranial nerve deficit.      Motor: No abnormal muscle tone.      Coordination: Coordination normal.      Deep Tendon Reflexes: Reflexes are normal and symmetric.   Psychiatric:         Mood and Affect: Mood normal.         Behavior: Behavior normal.         Thought Content: Thought content normal.         Judgment: Judgment normal.         REVIEWED DATA      Labs:    Lab Results   Component Value Date     09/17/2021    K 4.5 09/17/2021    CALCIUM 9.2 09/17/2021    AST 16 09/17/2021    ALT 11 09/17/2021    BUN 17 09/17/2021    CREATININE 0.75 09/17/2021    CREATININE 0.70 10/04/2020    CREATININE 0.70 09/29/2020    EGFRIFNONA 85 09/17/2021    EGFRIFAFRI 103 09/17/2021       Lab Results   Component Value Date    GLU 86 09/17/2021    HGBA1C 4.70 (L) 09/17/2021    HGBA1C 4.9 07/10/2020    HGBA1C 5.00 05/15/2020    TSH 0.122 (L) 09/17/2021    FREET4 2.37 (H) 09/17/2021       Lab Results   Component Value Date     (H) 09/17/2021    HDL 52 09/17/2021    TRIG 47 09/17/2021    CHOLHDLRATIO 3.44 09/17/2021       Lab Results   Component Value Date    RAKO49SQ 52.9 07/10/2020        Lab Results   Component Value Date    WBC 2.96 (L) 09/17/2021    HGB 13.3 09/17/2021    MCV 94.1 09/17/2021     09/17/2021       Lab Results   Component Value Date    PROTEIN Negative 09/17/2021    GLUCOSEU Negative 09/17/2021    BLOODU Negative 09/17/2021    NITRITEU Negative 09/17/2021    LEUKOCYTESUR Negative 09/17/2021          Lab Results   Component Value Date    HEPCVIRUSABY 0.1 09/17/2021       Imaging:        Medical Tests:        ASSESSMENT & PLAN     ANNUAL WELLNESS EXAM / PHYSICAL     Other medical problems addressed today:  Problem List Items Addressed This Visit        High    Hypothyroidism (Chronic)    Overview     H/o Grave's Dz s/p Rad I ablation         Current Assessment & Plan     Lab Results   Component Value Date     TSH 0.122 (L) 09/17/2021    TSH 1.800 09/21/2020    TSH 4.600 (H) 07/10/2020    FREET4 2.37 (H) 09/17/2021    FREET4 1.82 (H) 09/21/2020    FREET4 1.45 07/10/2020        Decrease Synthroid to 175 mcg. Recheck labs in 6 weeks.          Relevant Medications    levothyroxine (Synthroid) 175 MCG tablet    Other Relevant Orders    TSH+Free T4       Medium    Pernicious anemia (Chronic)    Current Assessment & Plan     Has not been compliant with injections.   Resume at 1000 mcg weekly x 1 month, biweekly x 2 months, then monthly.   Prescription sent to pharmacy.          Relevant Medications    folic acid (FOLVITE) 400 MCG tablet    Ferrous Sulfate Dried (FERROUS SULFATE IRON PO)    Cyanocobalamin 1000 MCG/ML kit      Other Visit Diagnoses     Encounter for annual health examination    -  Primary          Summary/Discussion:     • Primary reason for today's visit was for annual health examination. However above active medical issues also needed to be addressed today.      Next Appointment with me: Visit date not found    Return in about 4 months (around 1/24/2022) for Hypothyroidism.      HEALTHCARE MAINTENANCE ISSUES     Cancer Screening:  · Colon: Initial/Next screening at age: 44  · Repeat colon cancer screening: N/A at this time  · Breast: Recommended monthly self exams; annual professional exam  · Mammogram: every 1 year  · Cervical: 3 years  · Skin: Monthly self skin examination, annual exam by health professional  · Lung: Does not meet criteria for lung cancer screening.   · Other:    Screening Labs & Tests:  · Lab results reviewed & discussed with the patient or test orders placed today.  · EKG:  · CV Screening: Lipid panel  · DEXA (65+ or postmenopausal with risk factors):   · HEP C (If born 0825[birth year]4844, or risk factors): Negative screen  · Other:     Immunization/Vaccinations (to be given today unless deferred by patient)  · Influenza: Give flu shot today  · Hepatitis A: Verify immunization records  · Hepatitis  B: Up to date  · Tetanus/Pertussis: Up to date  · Pneumovax: Not needed at this time  · Shingles: Not needed at this time  · COVID: MATT MOREIRA COVID: Completed primary vaccine series and plans on getting the booster shot    Lifestyle Counseling:  · Lifestyle Modifications: Continue good lifestyle choices/modifications and Follow a low fat, low cholesterol diet  · Safety Issues: Always wear seatbelt, Avoid texting while driving   · Use sunscreen, regular skin examination  · Recommended annual dental/vision examination.  · Emotional/Stress/Sleep: Reviewed and  given when appropriate      Health Maintenance   Topic Date Due   • INFLUENZA VACCINE  10/01/2021   • MAMMOGRAM  08/04/2022   • ANNUAL PHYSICAL  09/25/2022   • TDAP/TD VACCINES (2 - Td or Tdap) 03/12/2023   • PAP SMEAR  03/10/2024   • HEPATITIS C SCREENING  Completed   • COVID-19 Vaccine  Completed   • Pneumococcal Vaccine 0-64  Aged Out           *Examiner was wearing KN95 mask and eye protection during the entire duration of the visit. Patient was masked the entire time. Minimum social distance of 6 ft maintained entire visit except if physical contact was necessary as documented.     **Dragon Disclaimer: Much of this encounter note is an electronic transcription/translation of spoken language to printed text. The electronic translation of spoken language may permit erroneous, or at times, nonsensical words or phrases to be inadvertently transcribed. Although I have reviewed the note for such errors, some may still exist.       Template created by Guzman Greene MD

## 2021-11-07 DIAGNOSIS — D51.0 PERNICIOUS ANEMIA: ICD-10-CM

## 2021-11-08 RX ORDER — CYANOCOBALAMIN 1000 UG/ML
INJECTION, SOLUTION INTRAMUSCULAR; SUBCUTANEOUS
Qty: 4 ML | Refills: 2 | Status: SHIPPED | OUTPATIENT
Start: 2021-11-08 | End: 2022-01-31

## 2021-12-17 DIAGNOSIS — E89.0 POSTABLATIVE HYPOTHYROIDISM: ICD-10-CM

## 2021-12-17 RX ORDER — LEVOTHYROXINE SODIUM 175 UG/1
TABLET ORAL
Qty: 90 TABLET | Refills: 0 | Status: SHIPPED | OUTPATIENT
Start: 2021-12-17 | End: 2022-03-18

## 2022-01-31 DIAGNOSIS — D51.0 PERNICIOUS ANEMIA: ICD-10-CM

## 2022-01-31 RX ORDER — CYANOCOBALAMIN 1000 UG/ML
INJECTION, SOLUTION INTRAMUSCULAR; SUBCUTANEOUS
Qty: 12 ML | Refills: 5 | Status: SHIPPED | OUTPATIENT
Start: 2022-01-31

## 2022-02-04 ENCOUNTER — OFFICE VISIT (OUTPATIENT)
Dept: INTERNAL MEDICINE | Age: 42
End: 2022-02-04

## 2022-02-04 VITALS
SYSTOLIC BLOOD PRESSURE: 92 MMHG | BODY MASS INDEX: 23.14 KG/M2 | OXYGEN SATURATION: 96 % | WEIGHT: 144 LBS | HEART RATE: 61 BPM | TEMPERATURE: 98.2 F | HEIGHT: 66 IN | DIASTOLIC BLOOD PRESSURE: 60 MMHG

## 2022-02-04 DIAGNOSIS — I95.9 HYPOTENSION, UNSPECIFIED HYPOTENSION TYPE: ICD-10-CM

## 2022-02-04 DIAGNOSIS — D51.0 PERNICIOUS ANEMIA: Chronic | ICD-10-CM

## 2022-02-04 DIAGNOSIS — E89.0 POSTABLATIVE HYPOTHYROIDISM: Primary | ICD-10-CM

## 2022-02-04 PROCEDURE — 99214 OFFICE O/P EST MOD 30 MIN: CPT | Performed by: INTERNAL MEDICINE

## 2022-02-04 NOTE — PROGRESS NOTES
"    I N T E R N A L  M E D I C I N E  J U N O H  K I M,  M D      ENCOUNTER DATE:  02/04/2022    Renetta TRAN Eleanor / 41 y.o. / female      CHIEF COMPLAINT / REASON FOR OFFICE VISIT     Hypothyroidism      ASSESSMENT & PLAN     Problem List Items Addressed This Visit        High    Hypothyroidism - Primary (Chronic)    Overview     H/o Grave's Dz s/p Rad I ablation         Current Assessment & Plan     Check TSH and Free T4 today.     Lab Results   Component Value Date    TSH 2.150 11/11/2021    TSH 0.122 (L) 09/17/2021    TSH 1.800 09/21/2020    FREET4 1.79 (H) 11/11/2021    FREET4 2.37 (H) 09/17/2021    FREET4 1.82 (H) 09/21/2020             Relevant Medications    levothyroxine (SYNTHROID, LEVOTHROID) 175 MCG tablet    Other Relevant Orders    TSH+Free T4       Medium    Pernicious anemia (Chronic)    Overview     Continue B12 injection monthly.          Relevant Medications    folic acid (FOLVITE) 400 MCG tablet    cyanocobalamin 1000 MCG/ML injection       Low    Hypotension    Current Assessment & Plan     Associated with fatigue. Discussed symptoms / signs of [place]'s. Increase sodium/fluid intake. If worsening, will evaluate for [place]'s.              Orders Placed This Encounter   Procedures   • TSH+Free T4     No orders of the defined types were placed in this encounter.      SUMMARY/DISCUSSION  •       Next Appointment with me: Visit date not found    Return in about 6 months (around 8/4/2022) for Reassess chronic medical problems.      VITAL SIGNS     Visit Vitals  BP 92/60 (BP Location: Left arm)   Pulse 61   Temp 98.2 °F (36.8 °C)   Ht 167.6 cm (66\")   Wt 65.3 kg (144 lb)   LMP 01/24/2022 (Approximate)   SpO2 96%   BMI 23.24 kg/m²       BP Readings from Last 3 Encounters:   02/04/22 92/60   09/24/21 100/62   07/17/20 116/70     Wt Readings from Last 3 Encounters:   02/04/22 65.3 kg (144 lb)   09/24/21 62.1 kg (137 lb)   07/17/20 68.4 kg (150 lb 12.8 oz)     Body mass index is 23.24 " kg/m².      MEDICATIONS AT THE TIME OF OFFICE VISIT     Current Outpatient Medications on File Prior to Visit   Medication Sig   • cetirizine (zyrTEC) 10 MG tablet Take 10 mg by mouth Daily As Needed.   • cyanocobalamin 1000 MCG/ML injection INJECT 1,000 MCG INTO THE APPROPRIATE MUSCLE AS DIRECTED BY PRESCRIBER 1 (ONE) TIME PER WEEK.   • levothyroxine (SYNTHROID, LEVOTHROID) 175 MCG tablet TAKE 1 TABLET BY MOUTH EVERY DAY IN THE MORNING   • folic acid (FOLVITE) 400 MCG tablet Take  by mouth.   • Omega-3 Fatty Acids (FISH OIL) 1000 MG capsule capsule Take  by mouth.     No current facility-administered medications on file prior to visit.          HISTORY OF PRESENT ILLNESS     Recently her levothyroxine had to be titrated and currently she is at 175 mcg.  Her weight tends to fluctuate a little bit and she does complain of fatigue.  Denies cold intolerance or gastrointestinal symptoms.  She has been better with her compliance B12 injections for pernicious anemia.      Patient Care Team:  Óscar Greene MD as PCP - General  Tiffany Goff MD as Consulting Physician (Obstetrics and Gynecology)    REVIEW OF SYSTEMS     Review of Systems   Fatigue   Mild intermittent lightheadedness with positional change  GI neg   Psych neg     PHYSICAL EXAMINATION     Physical Exam  General: No acute distress  Psych: Normal thought and judgment   Cardiovascular Rate: normal. Rhythm: regular. Heart sounds: normal.   Pulm/Chest: Effort normal, breath sounds normal.   Neck: No palpable mass        REVIEWED DATA     Labs:     Lab Results   Component Value Date     09/17/2021    K 4.5 09/17/2021    CALCIUM 9.2 09/17/2021    AST 16 09/17/2021    ALT 11 09/17/2021    BUN 17 09/17/2021    CREATININE 0.75 09/17/2021    CREATININE 0.70 10/04/2020    CREATININE 0.70 09/29/2020    EGFRIFNONA 85 09/17/2021    EGFRIFAFRI 103 09/17/2021       Lab Results   Component Value Date    HGBA1C 4.70 (L) 09/17/2021    HGBA1C 4.9 07/10/2020    HGBA1C 5.00  05/15/2020       Lab Results   Component Value Date     (H) 09/17/2021     (H) 07/09/2018    HDL 52 09/17/2021    TRIG 47 09/17/2021       Lab Results   Component Value Date    TSH 2.150 11/11/2021    TSH 0.122 (L) 09/17/2021    TSH 1.800 09/21/2020    FREET4 1.79 (H) 11/11/2021    FREET4 2.37 (H) 09/17/2021    FREET4 1.82 (H) 09/21/2020       Lab Results   Component Value Date    WBC 2.96 (L) 09/17/2021    HGB 13.3 09/17/2021     09/17/2021       No results found for: MICROALBUR        Imaging:           Medical Tests:           Summary of old records / correspondence / consultant report:           Request outside records:             *Examiner was wearing KN95 mask and eye protection during the entire duration of the visit. Patient was masked the entire time. Minimum social distance of 6 ft maintained entire visit except if physical contact was necessary as documented.       Template created by Guzman Greene MD

## 2022-02-04 NOTE — ASSESSMENT & PLAN NOTE
Associated with fatigue. Discussed symptoms / signs of Madhu's. Increase sodium/fluid intake. If worsening, will evaluate for Madhu's.

## 2022-02-04 NOTE — ASSESSMENT & PLAN NOTE
Check TSH and Free T4 today.     Lab Results   Component Value Date    TSH 2.150 11/11/2021    TSH 0.122 (L) 09/17/2021    TSH 1.800 09/21/2020    FREET4 1.79 (H) 11/11/2021    FREET4 2.37 (H) 09/17/2021    FREET4 1.82 (H) 09/21/2020

## 2022-02-05 LAB
T4 FREE SERPL-MCNC: 1.84 NG/DL (ref 0.82–1.77)
TSH SERPL DL<=0.005 MIU/L-ACNC: 0.51 UIU/ML (ref 0.45–4.5)

## 2022-03-18 DIAGNOSIS — E89.0 POSTABLATIVE HYPOTHYROIDISM: ICD-10-CM

## 2022-03-18 RX ORDER — LEVOTHYROXINE SODIUM 175 UG/1
TABLET ORAL
Qty: 30 TABLET | Refills: 0 | Status: SHIPPED | OUTPATIENT
Start: 2022-03-18 | End: 2022-04-18

## 2022-04-18 DIAGNOSIS — E89.0 POSTABLATIVE HYPOTHYROIDISM: ICD-10-CM

## 2022-04-19 RX ORDER — LEVOTHYROXINE SODIUM 175 UG/1
TABLET ORAL
Qty: 30 TABLET | Refills: 4 | Status: SHIPPED | OUTPATIENT
Start: 2022-04-19 | End: 2022-07-18

## 2022-07-17 DIAGNOSIS — E89.0 POSTABLATIVE HYPOTHYROIDISM: ICD-10-CM

## 2022-07-18 RX ORDER — LEVOTHYROXINE SODIUM 175 UG/1
TABLET ORAL
Qty: 90 TABLET | Refills: 3 | Status: SHIPPED | OUTPATIENT
Start: 2022-07-18 | End: 2022-08-10 | Stop reason: SDUPTHER

## 2022-08-09 ENCOUNTER — OFFICE VISIT (OUTPATIENT)
Dept: INTERNAL MEDICINE | Age: 42
End: 2022-08-09

## 2022-08-09 VITALS
DIASTOLIC BLOOD PRESSURE: 60 MMHG | HEART RATE: 72 BPM | SYSTOLIC BLOOD PRESSURE: 100 MMHG | BODY MASS INDEX: 22.02 KG/M2 | TEMPERATURE: 98.4 F | HEIGHT: 66 IN | WEIGHT: 137 LBS | OXYGEN SATURATION: 98 %

## 2022-08-09 DIAGNOSIS — E78.00 PURE HYPERCHOLESTEROLEMIA: ICD-10-CM

## 2022-08-09 DIAGNOSIS — D51.0 PERNICIOUS ANEMIA: Chronic | ICD-10-CM

## 2022-08-09 DIAGNOSIS — N89.8 VAGINAL DISCHARGE: ICD-10-CM

## 2022-08-09 DIAGNOSIS — E89.0 POSTABLATIVE HYPOTHYROIDISM: Primary | ICD-10-CM

## 2022-08-09 PROBLEM — I95.9 HYPOTENSION: Chronic | Status: ACTIVE | Noted: 2022-02-04

## 2022-08-09 PROCEDURE — 99214 OFFICE O/P EST MOD 30 MIN: CPT | Performed by: INTERNAL MEDICINE

## 2022-08-09 NOTE — PATIENT INSTRUCTIONS
** IMPORTANT MESSAGE FROM DR. CASTREJON **    In our office, your satisfaction is VERY important to us.     You may receive a survey from [facility] by mail or E-mail for you to provide feedback about your visit. This information is invaluable for me to know what we can do to improve our services.     I ask that you please take a few minutes to complete the survey and let us know how we are doing in serving your needs. (You may receive the survey more than once for multiple visits)    Thank You !    Dr. Castrejon    _________________________________________________________________________________________________________________________      ** ADDITIONAL INSTRUCTION / REMINDERS FROM DR. CASTREJON **

## 2022-08-09 NOTE — ASSESSMENT & PLAN NOTE
Maintain a low saturated fat diet.  Check labs today.      Lab Results   Component Value Date     (H) 09/17/2021     (H) 07/09/2018     Lab Results   Component Value Date    HDL 52 09/17/2021    HDL 57 07/09/2018     Lab Results   Component Value Date    TRIG 47 09/17/2021    TRIG 59 07/09/2018     Lab Results   Component Value Date    CHOLHDLRATIO 3.44 09/17/2021    CHOLHDLRATIO 3.12 07/09/2018

## 2022-08-09 NOTE — PROGRESS NOTES
I N T E R N A L  M E D I C I N E  J U N O H  K I M,  M D      ENCOUNTER DATE:  08/09/2022    Renetta TRAN Eleanor / 41 y.o. / female      CHIEF COMPLAINT / REASON FOR OFFICE VISIT     Postablative hypothyroidism, Hypotension, and Pernicious Anemia      ASSESSMENT & PLAN     Problem List Items Addressed This Visit        High    Hypothyroidism - Primary (Chronic)    Overview     H/o Grave's Dz s/p Rad I ablation    Continue levothyroxine 175 mcg         Relevant Medications    levothyroxine (SYNTHROID, LEVOTHROID) 175 MCG tablet    Other Relevant Orders    TSH+Free T4       Medium    Pernicious anemia (Chronic)    Overview     Continue B12 injection monthly.          Relevant Medications    folic acid (FOLVITE) 400 MCG tablet    cyanocobalamin 1000 MCG/ML injection    Other Relevant Orders    Comprehensive Metabolic Panel    CBC & Differential       Low    Pure hypercholesterolemia (Chronic)    Current Assessment & Plan     Maintain a low saturated fat diet.  Check labs today.      Lab Results   Component Value Date     (H) 09/17/2021     (H) 07/09/2018     Lab Results   Component Value Date    HDL 52 09/17/2021    HDL 57 07/09/2018     Lab Results   Component Value Date    TRIG 47 09/17/2021    TRIG 59 07/09/2018     Lab Results   Component Value Date    CHOLHDLRATIO 3.44 09/17/2021    CHOLHDLRATIO 3.12 07/09/2018             Relevant Orders    Lipid Panel With / Chol / HDL Ratio      Other Visit Diagnoses     Vaginal discharge        Relevant Orders    Urinalysis With Culture If Indicated - Urine, Clean Catch        Orders Placed This Encounter   Procedures   • Comprehensive Metabolic Panel   • Lipid Panel With / Chol / HDL Ratio   • TSH+Free T4   • Urinalysis With Culture If Indicated - Urine, Clean Catch   • CBC & Differential     No orders of the defined types were placed in this encounter.      SUMMARY/DISCUSSION  •       Next Appointment with me: Visit date not found    Return in about 6 months  "(around 2/9/2023) for Hypothyroidism.      VITAL SIGNS     Vitals:    08/09/22 0735   BP: 100/60   BP Location: Left arm   Pulse: 72   Temp: 98.4 °F (36.9 °C)   SpO2: 98%   Weight: 62.1 kg (137 lb)   Height: 167.6 cm (66\")       BP Readings from Last 3 Encounters:   08/09/22 100/60   02/04/22 92/60   09/24/21 100/62     Wt Readings from Last 3 Encounters:   08/09/22 62.1 kg (137 lb)   02/04/22 65.3 kg (144 lb)   09/24/21 62.1 kg (137 lb)     Body mass index is 22.11 kg/m².    Blood pressure readings recorded on patient flowsheet:  No flowsheet data found.       MEDICATIONS AT THE TIME OF OFFICE VISIT     Current Outpatient Medications on File Prior to Visit   Medication Sig   • cetirizine (zyrTEC) 10 MG tablet Take 10 mg by mouth Daily As Needed.   • cyanocobalamin 1000 MCG/ML injection INJECT 1,000 MCG INTO THE APPROPRIATE MUSCLE AS DIRECTED BY PRESCRIBER 1 (ONE) TIME PER WEEK.   • folic acid (FOLVITE) 400 MCG tablet Take  by mouth.   • levothyroxine (SYNTHROID, LEVOTHROID) 175 MCG tablet TAKE 1 TABLET BY MOUTH EVERY DAY IN THE MORNING   • Omega-3 Fatty Acids (FISH OIL) 1000 MG capsule capsule Take  by mouth.     No current facility-administered medications on file prior to visit.          HISTORY OF PRESENT ILLNESS     Patient complains of mild chronic fatigue which she attributes to having 2 young children.  She is compliant with levothyroxine 175 mcg for hypothyroidism.  She has history of Graves' disease status post radioactive iodine ablation.  She has pernicious anemia reports compliance with monthly B12 injections.  She has chronic hypotension which remains asymptomatic.  She is noted to have elevated LDL but she is unsure about her family history for hyperlipidemia.  She complains of 1 month history of clear vaginal discharge which occurs randomly.  Denies any pelvic pain, odorous discharge or vaginal bleeding.  She denies any discomfort with urination or sexual intercourse.  She plans to make an " appointment to see her gynecologist about this problem.      Patient Care Team:  Óscar Greene MD as PCP - General  Tiffany Goff MD as Consulting Physician (Obstetrics and Gynecology)    REVIEW OF SYSTEMS     Review of Systems   Constitutional neg except per HPI   Resp neg  CV neg   GI negative   No significant change in mood or cognition     PHYSICAL EXAMINATION     Physical Exam  General: No acute distress  Psych: Normal thought and judgment   Cardiovascular Rate: normal. Rhythm: regular. Heart sounds: normal.       REVIEWED DATA     Labs:     Lab Results   Component Value Date     09/17/2021    K 4.5 09/17/2021    CALCIUM 9.2 09/17/2021    AST 16 09/17/2021    ALT 11 09/17/2021    BUN 17 09/17/2021    CREATININE 0.75 09/17/2021    CREATININE 0.70 10/04/2020    CREATININE 0.70 09/29/2020    EGFRIFNONA 85 09/17/2021    EGFRIFAFRI 103 09/17/2021       Lab Results   Component Value Date    HGBA1C 4.70 (L) 09/17/2021    HGBA1C 4.9 07/10/2020    HGBA1C 5.00 05/15/2020       Lab Results   Component Value Date     (H) 09/17/2021     (H) 07/09/2018    HDL 52 09/17/2021    HDL 57 07/09/2018    TRIG 47 09/17/2021    TRIG 59 07/09/2018       Lab Results   Component Value Date    TSH 0.508 02/04/2022    TSH 2.150 11/11/2021    TSH 0.122 (L) 09/17/2021    FREET4 1.84 (H) 02/04/2022    FREET4 1.79 (H) 11/11/2021    FREET4 2.37 (H) 09/17/2021       Lab Results   Component Value Date    WBC 2.96 (L) 09/17/2021    HGB 13.3 09/17/2021     09/17/2021       No results found for: MALBCRERATIO       Imaging:           Medical Tests:           Summary of old records / correspondence / consultant report:           Request outside records:             *Examiner was wearing KN95 mask and eye protection during the entire duration of the visit. Patient was masked the entire time. Minimum social distance of 6 ft maintained entire visit except if physical contact was necessary as documented.       Template created  by Guzman Greene MD

## 2022-08-10 ENCOUNTER — DOCUMENTATION (OUTPATIENT)
Dept: INTERNAL MEDICINE | Age: 42
End: 2022-08-10

## 2022-08-10 DIAGNOSIS — E89.0 POSTABLATIVE HYPOTHYROIDISM: ICD-10-CM

## 2022-08-10 DIAGNOSIS — E89.0 POSTABLATIVE HYPOTHYROIDISM: Primary | ICD-10-CM

## 2022-08-10 LAB
ALBUMIN SERPL-MCNC: 4.4 G/DL (ref 3.8–4.8)
ALBUMIN/GLOB SERPL: 1.7 {RATIO} (ref 1.2–2.2)
ALP SERPL-CCNC: 51 IU/L (ref 44–121)
ALT SERPL-CCNC: 8 IU/L (ref 0–32)
APPEARANCE UR: CLEAR
AST SERPL-CCNC: 15 IU/L (ref 0–40)
BACTERIA #/AREA URNS HPF: NORMAL /[HPF]
BASOPHILS # BLD AUTO: 0 X10E3/UL (ref 0–0.2)
BASOPHILS NFR BLD AUTO: 1 %
BILIRUB SERPL-MCNC: 0.5 MG/DL (ref 0–1.2)
BILIRUB UR QL STRIP: NEGATIVE
BUN SERPL-MCNC: 15 MG/DL (ref 6–24)
BUN/CREAT SERPL: 18 (ref 9–23)
CALCIUM SERPL-MCNC: 9.4 MG/DL (ref 8.7–10.2)
CASTS URNS QL MICRO: NORMAL /LPF
CHLORIDE SERPL-SCNC: 103 MMOL/L (ref 96–106)
CHOLEST SERPL-MCNC: 176 MG/DL (ref 100–199)
CHOLEST/HDLC SERPL: 2.8 RATIO (ref 0–4.4)
CO2 SERPL-SCNC: 23 MMOL/L (ref 20–29)
COLOR UR: YELLOW
CREAT SERPL-MCNC: 0.84 MG/DL (ref 0.57–1)
EGFRCR SERPLBLD CKD-EPI 2021: 89 ML/MIN/1.73
EOSINOPHIL # BLD AUTO: 0.1 X10E3/UL (ref 0–0.4)
EOSINOPHIL NFR BLD AUTO: 1 %
EPI CELLS #/AREA URNS HPF: NORMAL /HPF (ref 0–10)
ERYTHROCYTE [DISTWIDTH] IN BLOOD BY AUTOMATED COUNT: 12.6 % (ref 11.7–15.4)
GLOBULIN SER CALC-MCNC: 2.6 G/DL (ref 1.5–4.5)
GLUCOSE SERPL-MCNC: 87 MG/DL (ref 65–99)
GLUCOSE UR QL STRIP: NEGATIVE
HCT VFR BLD AUTO: 41 % (ref 34–46.6)
HDLC SERPL-MCNC: 64 MG/DL
HGB BLD-MCNC: 13.5 G/DL (ref 11.1–15.9)
HGB UR QL STRIP: NEGATIVE
IMM GRANULOCYTES # BLD AUTO: 0 X10E3/UL (ref 0–0.1)
IMM GRANULOCYTES NFR BLD AUTO: 0 %
KETONES UR QL STRIP: ABNORMAL
LDLC SERPL CALC-MCNC: 102 MG/DL (ref 0–99)
LEUKOCYTE ESTERASE UR QL STRIP: NEGATIVE
LYMPHOCYTES # BLD AUTO: 1.4 X10E3/UL (ref 0.7–3.1)
LYMPHOCYTES NFR BLD AUTO: 35 %
MCH RBC QN AUTO: 29.3 PG (ref 26.6–33)
MCHC RBC AUTO-ENTMCNC: 32.9 G/DL (ref 31.5–35.7)
MCV RBC AUTO: 89 FL (ref 79–97)
MICRO URNS: ABNORMAL
MICRO URNS: ABNORMAL
MONOCYTES # BLD AUTO: 0.4 X10E3/UL (ref 0.1–0.9)
MONOCYTES NFR BLD AUTO: 10 %
NEUTROPHILS # BLD AUTO: 2.1 X10E3/UL (ref 1.4–7)
NEUTROPHILS NFR BLD AUTO: 53 %
NITRITE UR QL STRIP: NEGATIVE
PH UR STRIP: 6.5 [PH] (ref 5–7.5)
PLATELET # BLD AUTO: 232 X10E3/UL (ref 150–450)
POTASSIUM SERPL-SCNC: 4.3 MMOL/L (ref 3.5–5.2)
PROT SERPL-MCNC: 7 G/DL (ref 6–8.5)
PROT UR QL STRIP: NEGATIVE
RBC # BLD AUTO: 4.6 X10E6/UL (ref 3.77–5.28)
RBC #/AREA URNS HPF: NORMAL /HPF (ref 0–2)
SODIUM SERPL-SCNC: 140 MMOL/L (ref 134–144)
SP GR UR STRIP: 1.02 (ref 1–1.03)
T4 FREE SERPL-MCNC: 2.17 NG/DL (ref 0.82–1.77)
TRIGL SERPL-MCNC: 52 MG/DL (ref 0–149)
TSH SERPL DL<=0.005 MIU/L-ACNC: 0.33 UIU/ML (ref 0.45–4.5)
URINALYSIS REFLEX: ABNORMAL
UROBILINOGEN UR STRIP-MCNC: 1 MG/DL (ref 0.2–1)
VLDLC SERPL CALC-MCNC: 10 MG/DL (ref 5–40)
WBC # BLD AUTO: 3.9 X10E3/UL (ref 3.4–10.8)
WBC #/AREA URNS HPF: NORMAL /HPF (ref 0–5)

## 2022-08-10 NOTE — PROGRESS NOTES
CALL PATIENT with results:    1. Thyroid level is too high.   - decrease levothyroxine to 150 mcg every morning. Verify taking 175 mcg.   - recheck TFT in 6 weeks (IMPORTANT)    2. LDL (bad cholesterol) and HDL (good cholesterol) have improved     3. CBC is fine     4. Labs for kidney, liver and electrolyte are stable.     5. UA iOS without evidence of infection

## 2022-08-11 RX ORDER — LEVOTHYROXINE SODIUM 0.15 MG/1
150 TABLET ORAL
Qty: 30 TABLET | Refills: 1 | Status: SHIPPED | OUTPATIENT
Start: 2022-08-11 | End: 2022-09-06

## 2022-08-12 NOTE — ASSESSMENT & PLAN NOTE
Decrease levothyroxine to 150 mcg. Recheck labs in 6 weeks.     Lab Results   Component Value Date    TSH 0.327 (L) 08/09/2022    TSH 0.508 02/04/2022    TSH 2.150 11/11/2021    FREET4 2.17 (H) 08/09/2022    FREET4 1.84 (H) 02/04/2022    FREET4 1.79 (H) 11/11/2021

## 2022-09-06 DIAGNOSIS — E89.0 POSTABLATIVE HYPOTHYROIDISM: ICD-10-CM

## 2022-09-06 RX ORDER — LEVOTHYROXINE SODIUM 0.15 MG/1
TABLET ORAL
Qty: 30 TABLET | Refills: 2 | Status: SHIPPED | OUTPATIENT
Start: 2022-09-06 | End: 2022-12-08

## 2022-09-22 ENCOUNTER — DOCUMENTATION (OUTPATIENT)
Dept: INTERNAL MEDICINE | Age: 42
End: 2022-09-22

## 2022-09-22 DIAGNOSIS — E89.0 POSTABLATIVE HYPOTHYROIDISM: Primary | ICD-10-CM

## 2022-11-16 ENCOUNTER — TELEMEDICINE (OUTPATIENT)
Dept: INTERNAL MEDICINE | Age: 42
End: 2022-11-16

## 2022-11-16 VITALS — HEIGHT: 66 IN | BODY MASS INDEX: 22.02 KG/M2 | WEIGHT: 137 LBS

## 2022-11-16 DIAGNOSIS — J06.9 BACTERIAL URI: Primary | ICD-10-CM

## 2022-11-16 DIAGNOSIS — B96.89 BACTERIAL URI: Primary | ICD-10-CM

## 2022-11-16 PROCEDURE — 99213 OFFICE O/P EST LOW 20 MIN: CPT | Performed by: NURSE PRACTITIONER

## 2022-11-16 RX ORDER — AZITHROMYCIN 250 MG/1
TABLET, FILM COATED ORAL
Qty: 6 TABLET | Refills: 0 | Status: SHIPPED | OUTPATIENT
Start: 2022-11-16 | End: 2022-11-16

## 2022-11-16 RX ORDER — AZITHROMYCIN 250 MG/1
TABLET, FILM COATED ORAL
Qty: 6 TABLET | Refills: 0 | Status: SHIPPED | OUTPATIENT
Start: 2022-11-16 | End: 2022-11-21

## 2022-11-16 NOTE — PROGRESS NOTES
I N T E R N A L  M E D I C I N E  KASSIDY Lozano       ENCOUNTER DATE:  11/16/2022    Renetta TRAN Eleanor / 42 y.o. / female        You have chosen to receive care through a telehealth visit.  Do you consent to use a video/audio connection for your medical care today? YES    Location of patient is in [place]   Location of Provider is in [facility] office      CHIEF COMPLAINT / REASON FOR OFFICE VISIT     TELEHEALTH ENCOUNTER:  Diarrhea, Fever, Cough, and Headache      ASSESSMENT & PLAN     1. Bacterial Upper Respiratory Infection (Primary)        Discussion of Tamiflu and requesting Azithromycin for Bacterial URI.        Instructed Tylenol as needed for fever/pain       Instructed to continue vitamins for energy       - Instructed Mucinex DM as directed for cough and congetion    SUMMARY/DISCUSSION  • Patient tested negative for COVID 19 at home today. States kids came home from school with fever, cough, congestion. Discussion of Influenza and precautions.   • Follow up with Dr Greene as scheduled and as needed      Time spent: 20 minutes    Next Appointment with me: Visit date not found    No follow-ups on file.        HISTORY OF PRESENT ILLNESS     42 year old female being seen today via Telehealth for acute cough, congestion, headache, diarrhea. States her kids came home from school sick as well over the weekend. States tested at home negative for COVID19. Patient states diarrhea improved but remains with productive green secretions and fatigue.         REVIEWED DATA     Labs:           Imaging:           Medical Tests:           Summary of old records / correspondence / consultant report:           Request outside records:         KASSIDY Lozano

## 2022-12-08 DIAGNOSIS — E89.0 POSTABLATIVE HYPOTHYROIDISM: ICD-10-CM

## 2022-12-08 RX ORDER — LEVOTHYROXINE SODIUM 0.15 MG/1
TABLET ORAL
Qty: 90 TABLET | Refills: 1 | Status: SHIPPED | OUTPATIENT
Start: 2022-12-08

## 2023-02-09 ENCOUNTER — OFFICE VISIT (OUTPATIENT)
Dept: INTERNAL MEDICINE | Age: 43
End: 2023-02-09
Payer: COMMERCIAL

## 2023-02-09 VITALS
OXYGEN SATURATION: 99 % | SYSTOLIC BLOOD PRESSURE: 100 MMHG | BODY MASS INDEX: 22.5 KG/M2 | DIASTOLIC BLOOD PRESSURE: 70 MMHG | TEMPERATURE: 97.9 F | HEART RATE: 72 BPM | HEIGHT: 66 IN | WEIGHT: 140 LBS

## 2023-02-09 DIAGNOSIS — D51.0 PERNICIOUS ANEMIA: Chronic | ICD-10-CM

## 2023-02-09 DIAGNOSIS — M54.50 CHRONIC LEFT-SIDED LOW BACK PAIN WITHOUT SCIATICA: ICD-10-CM

## 2023-02-09 DIAGNOSIS — G89.29 CHRONIC LEFT-SIDED LOW BACK PAIN WITHOUT SCIATICA: ICD-10-CM

## 2023-02-09 DIAGNOSIS — E89.0 POSTABLATIVE HYPOTHYROIDISM: Primary | Chronic | ICD-10-CM

## 2023-02-09 PROBLEM — J06.9 BACTERIAL URI: Status: RESOLVED | Noted: 2022-11-16 | Resolved: 2023-02-09

## 2023-02-09 PROBLEM — B96.89 BACTERIAL URI: Status: RESOLVED | Noted: 2022-11-16 | Resolved: 2023-02-09

## 2023-02-09 PROCEDURE — 99214 OFFICE O/P EST MOD 30 MIN: CPT | Performed by: INTERNAL MEDICINE

## 2023-02-09 NOTE — ASSESSMENT & PLAN NOTE
Taking levothyroxine 150 mcg qAM correctly. Will have her check TSH and Free T4 later before taking morning dose.     Lab Results   Component Value Date    TSH 3.300 12/01/2022    TSH 0.916 09/21/2022    TSH 0.327 (L) 08/09/2022    FREET4 1.70 12/01/2022    FREET4 1.97 (H) 09/21/2022    FREET4 2.17 (H) 08/09/2022

## 2023-02-09 NOTE — PROGRESS NOTES
"    I N T E R N A L  M E D I C I N E    J U N O H  K I M,  M D      ENCOUNTER DATE:  02/09/2023    Renetta TRAN Eleanor / 42 y.o. / female    CHIEF COMPLAINT / REASON FOR OFFICE VISIT     Hypothyroidism, Pernicious Anemia (/), and Chronic low back pain (left)      ASSESSMENT & PLAN     Problem List Items Addressed This Visit        High    Hypothyroidism - Primary (Chronic)    Overview     H/o Grave's Dz s/p Rad I ablation         Current Assessment & Plan     Taking levothyroxine 150 mcg qAM correctly. Will have her check TSH and Free T4 later before taking morning dose.     Lab Results   Component Value Date    TSH 3.300 12/01/2022    TSH 0.916 09/21/2022    TSH 0.327 (L) 08/09/2022    FREET4 1.70 12/01/2022    FREET4 1.97 (H) 09/21/2022    FREET4 2.17 (H) 08/09/2022             Relevant Medications    levothyroxine (SYNTHROID, LEVOTHROID) 150 MCG tablet    Other Relevant Orders    TSH+Free T4       Medium    Pernicious anemia (Chronic)    Overview     Continue B12 injection monthly.          Relevant Medications    folic acid (FOLVITE) 400 MCG tablet    cyanocobalamin 1000 MCG/ML injection       Low    Chronic left-sided low back pain without sciatica (Chronic)    Current Assessment & Plan     Recommended PT evaluation, modify workout/home activities. Consider xray / MRI if worse.           Orders Placed This Encounter   Procedures   • TSH+Free T4     No orders of the defined types were placed in this encounter.      SUMMARY/DISCUSSION  •       Next Appointment with me: Visit date not found    Return in about 6 months (around 8/9/2023) for Reassess chronic medical problems.      VITAL SIGNS     Vitals:    02/09/23 0753   BP: 100/70   Pulse: 72   Temp: 97.9 °F (36.6 °C)   SpO2: 99%   Weight: 63.5 kg (140 lb)   Height: 167.6 cm (66\")       BP Readings from Last 3 Encounters:   02/09/23 100/70   08/09/22 100/60   02/04/22 92/60     Wt Readings from Last 3 Encounters:   02/09/23 63.5 kg (140 lb)   11/16/22 62.1 kg (137 " lb)   08/09/22 62.1 kg (137 lb)     Body mass index is 22.6 kg/m².    Blood pressure readings recorded on patient flowsheet:  No flowsheet data found.       MEDICATIONS AT THE TIME OF OFFICE VISIT     Current Outpatient Medications on File Prior to Visit   Medication Sig   • cetirizine (zyrTEC) 10 MG tablet Take 10 mg by mouth Daily As Needed.   • cyanocobalamin 1000 MCG/ML injection INJECT 1,000 MCG INTO THE APPROPRIATE MUSCLE AS DIRECTED BY PRESCRIBER 1 (ONE) TIME PER WEEK.   • folic acid (FOLVITE) 400 MCG tablet Take  by mouth.   • levothyroxine (SYNTHROID, LEVOTHROID) 150 MCG tablet TAKE 1 TABLET BY MOUTH EVERY DAY IN THE MORNING   • Omega-3 Fatty Acids (FISH OIL) 1000 MG capsule capsule Take  by mouth.     No current facility-administered medications on file prior to visit.          HISTORY OF PRESENT ILLNESS     She reports taking levothyroxine 150 mcg correctly each morning.  Prior TSH was greater than 3 but free T4 was 1.7 and clinically she appears to be euthyroid.  She takes B12 1000 mcg injection monthly.  She complains about the size of the needle that her pharmacy is providing her.  Her blood counts have remained normal and she denies any unusual fatigue.  She complains of chronic low back pain which lateralizes to the left side without any sciatic symptoms.  She reports that exercising regularly.  She does have a 2-year-old toddler at home and tends to carry the toddler on her left hip.  She denies any paresthesia, pain or weakness affecting left lower extremity.      Patient Care Team:  Óscar Greene MD as PCP - General  Tiffany Goff MD as Consulting Physician (Obstetrics and Gynecology)    REVIEW OF SYSTEMS     Review of Systems       PHYSICAL EXAMINATION     Physical Exam  Musculoskeletal:      Thoracic back: Normal.      Lumbar back: Spasms (left lower back) and tenderness present. No deformity. Normal range of motion. No scoliosis.       General: No acute distress  Psych: Normal thought and  judgment         REVIEWED DATA     Labs:     Lab Results   Component Value Date     08/09/2022    K 4.3 08/09/2022    CALCIUM 9.4 08/09/2022    AST 15 08/09/2022    ALT 8 08/09/2022    BUN 15 08/09/2022    CREATININE 0.84 08/09/2022    CREATININE 0.75 09/17/2021    CREATININE 0.70 10/04/2020    EGFRIFNONA 85 09/17/2021    EGFRIFAFRI 103 09/17/2021       Lab Results   Component Value Date    HGBA1C 4.70 (L) 09/17/2021    HGBA1C 4.9 07/10/2020    HGBA1C 5.00 05/15/2020       Lab Results   Component Value Date     (H) 08/09/2022     (H) 09/17/2021     (H) 07/09/2018    HDL 64 08/09/2022    HDL 52 09/17/2021    TRIG 52 08/09/2022    TRIG 47 09/17/2021       Lab Results   Component Value Date    TSH 3.300 12/01/2022    TSH 0.916 09/21/2022    TSH 0.327 (L) 08/09/2022    FREET4 1.70 12/01/2022    FREET4 1.97 (H) 09/21/2022    FREET4 2.17 (H) 08/09/2022       Lab Results   Component Value Date    WBC 3.9 08/09/2022    HGB 13.5 08/09/2022     08/09/2022       No results found for: MALBCRERATIO       Imaging:           Medical Tests:           Summary of old records / correspondence / consultant report:           Request outside records:             *Examiner was wearing KN95 mask and eye protection during the entire duration of the visit. Patient was masked the entire time. Minimum social distance of 6 ft maintained entire visit except if physical contact was necessary as documented.       Template created by Guzman Greene MD

## 2023-02-17 LAB
T4 FREE SERPL-MCNC: 1.77 NG/DL (ref 0.93–1.7)
TSH SERPL DL<=0.005 MIU/L-ACNC: 5.09 UIU/ML (ref 0.27–4.2)

## 2023-02-19 NOTE — PROGRESS NOTES
MyChart:    Here are the result(s) of your test(s):     TSH is higher but free T4 looks fine. Continue same for now.      Please do not hesitate to contact me if you have questions.

## 2023-06-07 DIAGNOSIS — E89.0 POSTABLATIVE HYPOTHYROIDISM: ICD-10-CM

## 2023-06-07 RX ORDER — LEVOTHYROXINE SODIUM 0.15 MG/1
TABLET ORAL
Qty: 90 TABLET | Refills: 1 | Status: SHIPPED | OUTPATIENT
Start: 2023-06-07

## 2023-08-10 ENCOUNTER — OFFICE VISIT (OUTPATIENT)
Dept: INTERNAL MEDICINE | Age: 43
End: 2023-08-10
Payer: COMMERCIAL

## 2023-08-10 VITALS
WEIGHT: 133 LBS | TEMPERATURE: 97.8 F | HEART RATE: 60 BPM | OXYGEN SATURATION: 99 % | DIASTOLIC BLOOD PRESSURE: 62 MMHG | SYSTOLIC BLOOD PRESSURE: 98 MMHG | HEIGHT: 66 IN | BODY MASS INDEX: 21.38 KG/M2

## 2023-08-10 DIAGNOSIS — D51.0 PERNICIOUS ANEMIA: Chronic | ICD-10-CM

## 2023-08-10 DIAGNOSIS — M22.2X1 PATELLOFEMORAL PAIN SYNDROME OF RIGHT KNEE: ICD-10-CM

## 2023-08-10 DIAGNOSIS — E89.0 POSTABLATIVE HYPOTHYROIDISM: Primary | Chronic | ICD-10-CM

## 2023-08-10 PROCEDURE — 99214 OFFICE O/P EST MOD 30 MIN: CPT | Performed by: INTERNAL MEDICINE

## 2023-08-10 NOTE — PATIENT INSTRUCTIONS
** IMPORTANT MESSAGE FROM DR. CASTREJON **    In our office, your satisfaction is VERY important to us.     You may receive a survey from [facility] by mail or E-mail for you to provide feedback about your visit. This information is invaluable for me to know what we can do to improve our services.     I ask that you please take a few minutes to complete the survey and let us know how we are doing in serving your needs. (You may receive the survey more than once for multiple visits)    Thank You !    Dr. Castrejon    _________________________________________________________________________________________________________________________      ** ADDITIONAL INSTRUCTION / REMINDERS FROM DR. CASTREJON **

## 2023-08-10 NOTE — ASSESSMENT & PLAN NOTE
Check lab today. Taking levothyroxine 150 mcg. Advised to take medication correctly. Avoid taking 2 hours prior to labs.     Lab Results   Component Value Date    TSH 5.090 (H) 02/16/2023    TSH 3.300 12/01/2022    TSH 0.916 09/21/2022    FREET4 1.77 (H) 02/16/2023    FREET4 1.70 12/01/2022    FREET4 1.97 (H) 09/21/2022

## 2023-08-10 NOTE — ASSESSMENT & PLAN NOTE
Modify activity/exercise. Take Aleve PRN. Information provided on AVS. Call for xray if not improving.

## 2023-08-10 NOTE — PROGRESS NOTES
I N T E R N A L  M E D I C I N E    J U N O H  K I M,  M D      ENCOUNTER DATE:  08/10/2023    Renetta RTAN Eleanor / 42 y.o. / female    CHIEF COMPLAINT / REASON FOR OFFICE VISIT     Postablative hypothyroidism, Hypotension, and Pure hypercholesterolemia      ASSESSMENT & PLAN     Problem List Items Addressed This Visit          High    Hypothyroidism - Primary (Chronic)    Overview     H/o Grave's Dz s/p Rad I ablation         Current Assessment & Plan     Check lab today. Taking levothyroxine 150 mcg. Advised to take medication correctly. Avoid taking 2 hours prior to labs.     Lab Results   Component Value Date    TSH 5.090 (H) 02/16/2023    TSH 3.300 12/01/2022    TSH 0.916 09/21/2022    FREET4 1.77 (H) 02/16/2023    FREET4 1.70 12/01/2022    FREET4 1.97 (H) 09/21/2022             Relevant Medications    levothyroxine (SYNTHROID, LEVOTHROID) 150 MCG tablet    Other Relevant Orders    TSH+Free T4    Patellofemoral pain syndrome of right knee    Current Assessment & Plan     Modify activity/exercise. Take Aleve PRN. Information provided on AVS. Call for xray if not improving.             Medium    Pernicious anemia (Chronic)    Overview     Continue B12 injection monthly.          Current Assessment & Plan     Does not appear to be taking the shot weekly. Advised to do so. Check lab today.          Relevant Medications    folic acid (FOLVITE) 400 MCG tablet    cyanocobalamin 1000 MCG/ML injection    Other Relevant Orders    Vitamin B12     Orders Placed This Encounter   Procedures    TSH+Free T4    Vitamin B12     No orders of the defined types were placed in this encounter.      SUMMARY/DISCUSSION  She will think about neuropsychological evaluation for ADHD if she is interested in medication to treat probable underlying ADHD.       Next Appointment with me: Visit date not found    Return in about 6 months (around 2/10/2024) for Reassess chronic medical problems.      VITAL SIGNS     Vitals:    08/10/23 0938   BP:  "98/62   Pulse: 60   Temp: 97.8 øF (36.6 øC)   SpO2: 99%   Weight: 60.3 kg (133 lb)   Height: 167.6 cm (66\")       BP Readings from Last 3 Encounters:   08/10/23 98/62   02/09/23 100/70   08/09/22 100/60     Wt Readings from Last 3 Encounters:   08/10/23 60.3 kg (133 lb)   02/09/23 63.5 kg (140 lb)   11/16/22 62.1 kg (137 lb)     Body mass index is 21.47 kg/mý.    Blood pressure readings recorded on patient flowsheet:       No data to display                  MEDICATIONS AT THE TIME OF OFFICE VISIT     Current Outpatient Medications on File Prior to Visit   Medication Sig    cetirizine (zyrTEC) 10 MG tablet Take 1 tablet by mouth Daily As Needed.    cyanocobalamin 1000 MCG/ML injection INJECT 1,000 MCG INTO THE APPROPRIATE MUSCLE AS DIRECTED BY PRESCRIBER 1 (ONE) TIME PER WEEK.    levothyroxine (SYNTHROID, LEVOTHROID) 150 MCG tablet TAKE 1 TABLET BY MOUTH EVERY DAY IN THE MORNING    folic acid (FOLVITE) 400 MCG tablet Take  by mouth. (Patient not taking: Reported on 8/10/2023)    Omega-3 Fatty Acids (FISH OIL) 1000 MG capsule capsule Take  by mouth. (Patient not taking: Reported on 8/10/2023)     No current facility-administered medications on file prior to visit.          HISTORY OF PRESENT ILLNESS     Patient complains of anterior and posterior right knee pain that began approximately 3 weeks ago after running. The posterior pain started approximately 2 days ago. She describes tightness and crepitus in the knee as well as pain with flexion. Patient has not modified her activities. She denies hearing a pop or clicking sound at the time. Patient took Advil for the pain, applied ice, and tried taping with no relief. She confirms mild swelling.      Patient reports she feels she is more \"scatterbrained\" forgetful, and difficulties focusing. She admits it does affect her work and personal life. She did not have a formal evaluation or testing for ADHD as a child. She defers medication at this time.     Her blood pressure " is well controlled in the office today. She has lost 7 pounds since 02/2023. She continues to take vitamin B12 injections monthly but has missed doses. Her last injection was approximately 1 month ago. She continues to take levothyroxine 150 mcg daily. Patient reports a spot on her face that appeared approximately 2 weeks ago.       Patient Care Team:  Óscar Greene MD as PCP - General  Tiffany Goff MD as Consulting Physician (Obstetrics and Gynecology)    REVIEW OF SYSTEMS     Review of Systems       PHYSICAL EXAMINATION     Physical Exam  Constitutional:       General: She is not in acute distress.  Musculoskeletal:      Right knee: Crepitus (mild) present. No swelling. Normal range of motion. No tenderness. Normal alignment, normal meniscus and normal patellar mobility.      Left knee: No swelling or crepitus. Normal range of motion. No tenderness.   Neurological:      Mental Status: She is alert.     Alert with normal thought and judgment.       REVIEWED DATA     Labs:     Lab Results   Component Value Date     08/09/2022    K 4.3 08/09/2022    CALCIUM 9.4 08/09/2022    AST 15 08/09/2022    ALT 8 08/09/2022    BUN 15 08/09/2022    CREATININE 0.84 08/09/2022    CREATININE 0.75 09/17/2021    CREATININE 0.70 10/04/2020    EGFRIFNONA 85 09/17/2021    EGFRIFAFRI 103 09/17/2021       Lab Results   Component Value Date    HGBA1C 4.70 (L) 09/17/2021    HGBA1C 4.9 07/10/2020    HGBA1C 5.00 05/15/2020       Lab Results   Component Value Date     (H) 08/09/2022     (H) 09/17/2021     (H) 07/09/2018    HDL 64 08/09/2022    HDL 52 09/17/2021    TRIG 52 08/09/2022    TRIG 47 09/17/2021       Lab Results   Component Value Date    TSH 5.090 (H) 02/16/2023    TSH 3.300 12/01/2022    TSH 0.916 09/21/2022    FREET4 1.77 (H) 02/16/2023    FREET4 1.70 12/01/2022    FREET4 1.97 (H) 09/21/2022       Lab Results   Component Value Date    WBC 3.9 08/09/2022    HGB 13.5 08/09/2022     08/09/2022        Lab Results   Component Value Date    [id number] 271 12/14/2017      No results found for: MALBCRERATIO         Imaging:           Medical Tests:           Summary of old records / correspondence / consultant report:           Request outside records:     Transcribed from ambient dictation for Óscar Greene MD by Kristine Serna.  08/10/23   11:27 EDT    Patient or patient representative verbalized consent to the visit recording.  I have personally performed the services described in this document as transcribed by the above individual, and it is both accurate and complete.  Óscar Greene MD  8/10/2023  12:22 EDT

## 2023-08-16 LAB
T4 FREE SERPL-MCNC: 1.51 NG/DL (ref 0.93–1.7)
TSH SERPL DL<=0.005 MIU/L-ACNC: 8.71 UIU/ML (ref 0.27–4.2)
VIT B12 SERPL-MCNC: 159 PG/ML (ref 211–946)

## 2023-08-17 DIAGNOSIS — E89.0 POSTABLATIVE HYPOTHYROIDISM: Primary | Chronic | ICD-10-CM

## 2023-08-17 DIAGNOSIS — E89.0 POSTABLATIVE HYPOTHYROIDISM: Primary | ICD-10-CM

## 2023-08-17 RX ORDER — LEVOTHYROXINE SODIUM 0.03 MG/1
12.5 TABLET ORAL
Qty: 15 TABLET | Refills: 1 | Status: CANCELLED | OUTPATIENT
Start: 2023-08-17

## 2023-08-17 NOTE — PROGRESS NOTES
CALL PATIENT WITH TEST RESULTS:  - Be sure to communicate DIRECTLY with the patient/surrogate EVEN IF the result(s) has been released or viewed by the patient on [employer]) .  - Also, mail the results IF [facility] is NOT active.      1) Thyroid dose can be optimized. Verify taking Synthroid 150 mcg. If so, add Synthroid 1/2 of 25 mcg qAM. This is a total of 162.5 mcg qAM. Make sure she is taking NAME BRAND SYNTHROID. Recheck TSH and Free T4 in 6 weeks.     2) B12 level is very low. Verify how regularly she has been taking the B12 shot over the last 6 months.   - if she has not been consistent, she needs to ramp up the B12 1000 mcg shots every 2 weeks for 2 months, then monthly. This is very important for proper neurological and hematological functions.

## 2023-08-21 RX ORDER — LEVOTHYROXINE SODIUM 25 MCG
12.5 TABLET ORAL
Qty: 15 TABLET | Refills: 2 | Status: SHIPPED | OUTPATIENT
Start: 2023-08-21

## 2023-08-21 RX ORDER — LEVOTHYROXINE SODIUM 150 MCG
150 TABLET ORAL
Qty: 30 TABLET | Refills: 2 | Status: SHIPPED | OUTPATIENT
Start: 2023-08-21

## 2023-09-01 ENCOUNTER — DOCUMENTATION (OUTPATIENT)
Dept: INTERNAL MEDICINE | Age: 43
End: 2023-09-01
Payer: COMMERCIAL

## 2023-09-01 NOTE — ASSESSMENT & PLAN NOTE
Verify taking name brand Synthroid.   Add Synthroid 25 mcg 1/2 tab qAM to 150 mcg dose.   Recheck TSH and Free T4 in 4-6 weeks.     Lab Results   Component Value Date    TSH 8.710 (H) 08/16/2023    TSH 5.090 (H) 02/16/2023    TSH 3.300 12/01/2022    FREET4 1.51 08/16/2023    FREET4 1.77 (H) 02/16/2023    FREET4 1.70 12/01/2022

## 2023-11-20 DIAGNOSIS — E89.0 POSTABLATIVE HYPOTHYROIDISM: ICD-10-CM

## 2023-11-21 RX ORDER — LEVOTHYROXINE SODIUM 25 MCG
12.5 TABLET ORAL
Qty: 15 TABLET | Refills: 5 | Status: SHIPPED | OUTPATIENT
Start: 2023-11-21

## 2023-11-21 RX ORDER — LEVOTHYROXINE SODIUM 150 MCG
150 TABLET ORAL
Qty: 30 TABLET | Refills: 5 | Status: SHIPPED | OUTPATIENT
Start: 2023-11-21

## 2024-02-12 ENCOUNTER — OFFICE VISIT (OUTPATIENT)
Dept: INTERNAL MEDICINE | Age: 44
End: 2024-02-12
Payer: COMMERCIAL

## 2024-02-12 VITALS
HEART RATE: 62 BPM | BODY MASS INDEX: 22.34 KG/M2 | WEIGHT: 139 LBS | SYSTOLIC BLOOD PRESSURE: 90 MMHG | TEMPERATURE: 97.3 F | DIASTOLIC BLOOD PRESSURE: 60 MMHG | HEIGHT: 66 IN | OXYGEN SATURATION: 98 %

## 2024-02-12 DIAGNOSIS — E89.0 POSTABLATIVE HYPOTHYROIDISM: Primary | Chronic | ICD-10-CM

## 2024-02-12 DIAGNOSIS — R41.840 ATTENTION DEFICIT: ICD-10-CM

## 2024-02-12 DIAGNOSIS — G43.109 MIGRAINE WITH AURA AND WITHOUT STATUS MIGRAINOSUS, NOT INTRACTABLE: ICD-10-CM

## 2024-02-12 DIAGNOSIS — D51.0 PERNICIOUS ANEMIA: Chronic | ICD-10-CM

## 2024-02-12 LAB
BASOPHILS # BLD AUTO: 0.01 10*3/MM3 (ref 0–0.2)
BASOPHILS NFR BLD AUTO: 0.3 % (ref 0–1.5)
EOSINOPHIL # BLD AUTO: 0.06 10*3/MM3 (ref 0–0.4)
EOSINOPHIL NFR BLD AUTO: 1.7 % (ref 0.3–6.2)
ERYTHROCYTE [DISTWIDTH] IN BLOOD BY AUTOMATED COUNT: 13.7 % (ref 12.3–15.4)
HCT VFR BLD AUTO: 34.8 % (ref 34–46.6)
HGB BLD-MCNC: 10.9 G/DL (ref 12–15.9)
IMM GRANULOCYTES # BLD AUTO: 0.01 10*3/MM3 (ref 0–0.05)
IMM GRANULOCYTES NFR BLD AUTO: 0.3 % (ref 0–0.5)
LYMPHOCYTES # BLD AUTO: 1.02 10*3/MM3 (ref 0.7–3.1)
LYMPHOCYTES NFR BLD AUTO: 28.3 % (ref 19.6–45.3)
MCH RBC QN AUTO: 26.8 PG (ref 26.6–33)
MCHC RBC AUTO-ENTMCNC: 31.3 G/DL (ref 31.5–35.7)
MCV RBC AUTO: 85.5 FL (ref 79–97)
MONOCYTES # BLD AUTO: 0.33 10*3/MM3 (ref 0.1–0.9)
MONOCYTES NFR BLD AUTO: 9.1 % (ref 5–12)
NEUTROPHILS # BLD AUTO: 2.18 10*3/MM3 (ref 1.7–7)
NEUTROPHILS NFR BLD AUTO: 60.3 % (ref 42.7–76)
NRBC BLD AUTO-RTO: 0 /100 WBC (ref 0–0.2)
PLATELET # BLD AUTO: 286 10*3/MM3 (ref 140–450)
RBC # BLD AUTO: 4.07 10*6/MM3 (ref 3.77–5.28)
T4 FREE SERPL-MCNC: 1.29 NG/DL (ref 0.93–1.7)
TSH SERPL DL<=0.005 MIU/L-ACNC: 10.8 UIU/ML (ref 0.27–4.2)
WBC # BLD AUTO: 3.61 10*3/MM3 (ref 3.4–10.8)

## 2024-02-12 PROCEDURE — 99214 OFFICE O/P EST MOD 30 MIN: CPT | Performed by: INTERNAL MEDICINE

## 2024-02-12 RX ORDER — CYANOCOBALAMIN (VITAMIN B-12) 100 MCG
1 TABLET ORAL DAILY
COMMUNITY
Start: 2024-02-12

## 2024-02-19 ENCOUNTER — DOCUMENTATION (OUTPATIENT)
Dept: INTERNAL MEDICINE | Age: 44
End: 2024-02-19
Payer: COMMERCIAL

## 2024-02-19 DIAGNOSIS — E89.0 POSTABLATIVE HYPOTHYROIDISM: ICD-10-CM

## 2024-02-19 RX ORDER — LEVOTHYROXINE SODIUM 175 MCG
175 TABLET ORAL
Qty: 30 TABLET | Refills: 1 | Status: SHIPPED | OUTPATIENT
Start: 2024-02-19

## 2024-02-19 RX ORDER — LEVOTHYROXINE SODIUM 25 MCG
12.5 TABLET ORAL
Qty: 15 TABLET | Refills: 1 | Status: SHIPPED | OUTPATIENT
Start: 2024-02-19

## 2024-02-19 NOTE — TELEPHONE ENCOUNTER
----- Message from Óscar Greene MD sent at 2/16/2024  7:01 AM EST -----  Regarding: Thyroid   Contact: [phone]  Call patient:     Will increase Synthroid (CHASE) to 175 mcg + 1/2 of 25 mcg every morning (total of 187.5 mcg). Recheck labs in 6 weeks.   Send order for new dose for authorization if agreeable.  ----- Message -----  From: Renetta Webster  Sent: 2/15/2024  11:04 AM EST  To: Óscar Greene MD  Subject: Blood work                                       I take the 150 + half of the 25 daily

## 2024-02-19 NOTE — ASSESSMENT & PLAN NOTE
Lab Results   Component Value Date    TSH 10.800 (H) 02/12/2024    TSH 5.950 (H) 09/28/2023    TSH 8.710 (H) 08/16/2023    FREET4 1.29 02/12/2024    FREET4 1.40 09/28/2023    FREET4 1.51 08/16/2023      Increase Synthroid to 175 + 1/2 of 25 mcg (187.5 mcg total).   Recheck labs in 6 weeks.

## 2024-04-01 DIAGNOSIS — E89.0 POSTABLATIVE HYPOTHYROIDISM: Primary | ICD-10-CM

## 2024-04-01 LAB
T4 FREE SERPL-MCNC: 1.51 NG/DL (ref 0.93–1.7)
TSH SERPL DL<=0.005 MIU/L-ACNC: 3.37 UIU/ML (ref 0.27–4.2)

## 2024-04-20 DIAGNOSIS — E89.0 POSTABLATIVE HYPOTHYROIDISM: ICD-10-CM

## 2024-04-22 RX ORDER — LEVOTHYROXINE SODIUM 175 MCG
TABLET ORAL
Qty: 30 TABLET | Refills: 2 | Status: SHIPPED | OUTPATIENT
Start: 2024-04-22

## 2024-06-12 ENCOUNTER — OFFICE VISIT (OUTPATIENT)
Dept: INTERNAL MEDICINE | Age: 44
End: 2024-06-12
Payer: COMMERCIAL

## 2024-06-12 VITALS
OXYGEN SATURATION: 99 % | DIASTOLIC BLOOD PRESSURE: 74 MMHG | HEART RATE: 66 BPM | BODY MASS INDEX: 22.66 KG/M2 | HEIGHT: 66 IN | SYSTOLIC BLOOD PRESSURE: 96 MMHG | WEIGHT: 141 LBS | TEMPERATURE: 97.3 F

## 2024-06-12 DIAGNOSIS — E89.0 POSTABLATIVE HYPOTHYROIDISM: Primary | Chronic | ICD-10-CM

## 2024-06-12 DIAGNOSIS — D51.0 PERNICIOUS ANEMIA: Chronic | ICD-10-CM

## 2024-06-12 DIAGNOSIS — F39 MOOD DISORDER: ICD-10-CM

## 2024-06-12 LAB
T4 FREE SERPL-MCNC: 1.53 NG/DL (ref 0.92–1.68)
TSH SERPL DL<=0.005 MIU/L-ACNC: 3.24 UIU/ML (ref 0.27–4.2)
VIT B12 SERPL-MCNC: 1762 PG/ML (ref 211–946)

## 2024-06-12 PROCEDURE — 99214 OFFICE O/P EST MOD 30 MIN: CPT | Performed by: INTERNAL MEDICINE

## 2024-06-12 RX ORDER — SERTRALINE HYDROCHLORIDE 25 MG/1
25 TABLET, FILM COATED ORAL EVERY MORNING
Qty: 30 TABLET | Refills: 1 | Status: SHIPPED | OUTPATIENT
Start: 2024-06-12

## 2024-06-12 NOTE — ASSESSMENT & PLAN NOTE
Trial of sertraline 25 mg qAM.   Recommended counseling for anxiety and stress mgmt.   Follow-up 3 months.     Discussed side effects, adverse effects and discussed need to monitor for worsening depression, anxiety/irritability, suicidal thoughts/ideations, or significant mood swings. She expressed understanding and agreed to follow above instructions.

## 2024-06-12 NOTE — ASSESSMENT & PLAN NOTE
Lab Results   Component Value Date    TSH 3.370 04/01/2024    TSH 10.800 (H) 02/12/2024    TSH 5.950 (H) 09/28/2023    FREET4 1.51 04/01/2024    FREET4 1.29 02/12/2024    FREET4 1.40 09/28/2023      Check labs today. Taking Synthroid 175 mcg + 1/2 of 25 mcg daily.

## 2024-06-12 NOTE — PROGRESS NOTES
I N T E R N A L  M E D I C I N E    J U N O H  K I M,  M D      ENCOUNTER DATE:  06/12/2024    Renetta TRAN Eleanor / 43 y.o. / female    CHIEF COMPLAINT / REASON FOR OFFICE VISIT     Postablative hypothyroidism, Pernicious Anemia, and Anxiety      ASSESSMENT & PLAN     Problem List Items Addressed This Visit          High    Hypothyroidism - Primary (Chronic)    Overview     H/o Grave's Dz s/p Rad I ablation         Current Assessment & Plan     Lab Results   Component Value Date    TSH 3.370 04/01/2024    TSH 10.800 (H) 02/12/2024    TSH 5.950 (H) 09/28/2023    FREET4 1.51 04/01/2024    FREET4 1.29 02/12/2024    FREET4 1.40 09/28/2023      Check labs today. Taking Synthroid 175 mcg + 1/2 of 25 mcg daily.          Relevant Medications    Synthroid 25 MCG tablet    Synthroid 175 MCG tablet    Other Relevant Orders    TSH+Free T4    Mood disorder (Chronic)    Current Assessment & Plan     Trial of sertraline 25 mg qAM.   Recommended counseling for anxiety and stress mgmt.   Follow-up 3 months.     Discussed side effects, adverse effects and discussed need to monitor for worsening depression, anxiety/irritability, suicidal thoughts/ideations, or significant mood swings. She expressed understanding and agreed to follow above instructions.           Relevant Medications    sertraline (Zoloft) 25 MG tablet       Medium    Pernicious anemia (Chronic)    Current Assessment & Plan     Taking B12 1000 mcg SL daily.   Check B12 level today.     Lab Results   Component Value Date    [id number] 159 (L) 08/16/2023             Relevant Medications    folic acid (FOLVITE) 400 MCG tablet    Cyanocobalamin (B-12-SL) 1000 MCG sublingual tablet    Other Relevant Orders    Vitamin B12     Orders Placed This Encounter   Procedures    TSH+Free T4    Vitamin B12     New Medications Ordered This Visit   Medications    sertraline (Zoloft) 25 MG tablet     Sig: Take 1 tablet by mouth Every Morning.     Dispense:  30 tablet     Refill:  1  "      SUMMARY/DISCUSSION      Next Appointment with me: Visit date not found    Return in about 3 months (around 9/12/2024) for Reassess today's problem(s).      VITAL SIGNS     Vitals:    06/12/24 0841   BP: 96/74   Pulse: 66   Temp: 97.3 °F (36.3 °C)   SpO2: 99%   Weight: 64 kg (141 lb)   Height: 167.6 cm (66\")       BP Readings from Last 3 Encounters:   06/12/24 96/74   02/12/24 90/60   08/10/23 98/62     Wt Readings from Last 3 Encounters:   06/12/24 64 kg (141 lb)   02/12/24 63 kg (139 lb)   08/10/23 60.3 kg (133 lb)     Body mass index is 22.76 kg/m².    Blood pressure readings recorded on patient flowsheet:       No data to display                  MEDICATIONS AT THE TIME OF OFFICE VISIT     Current Outpatient Medications on File Prior to Visit   Medication Sig    cetirizine (zyrTEC) 10 MG tablet Take 1 tablet by mouth Daily As Needed.    Cyanocobalamin (B-12-SL) 1000 MCG sublingual tablet Place 1 tablet under the tongue Daily.    folic acid (FOLVITE) 400 MCG tablet Take  by mouth.    Omega-3 Fatty Acids (FISH OIL) 1000 MG capsule capsule Take  by mouth.    Synthroid 175 MCG tablet TAKE 1 TABLET BY MOUTH EVERY MORNING (TAKE 175MCG+1/2 OF 25MCG).    Synthroid 25 MCG tablet Take 0.5 tablets by mouth Every Morning.     No current facility-administered medications on file prior to visit.          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  Patient states that her  is very busy with his work and she has to assume most of the parenting responsibility.  She also is going through some hormonal changes and has noticed increased mood difficulties with irritability/agitation.  At times she also questions whether she could be mildly depressed.  She is considering pursuing counseling/therapy through her work.  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  She is on Synthroid 175 mcg plus 1/2 of 25 mcg daily.  Previously she started taking sublingual B12 supplement for pernicious anemia.       Patient Care Team:  Óscar Greene MD as PCP - General  Tiffany Goff MD as Consulting Physician (Obstetrics and Gynecology)    REVIEW OF SYSTEMS     Review of Systems       PHYSICAL EXAMINATION     Physical Exam  Alert with normal thought and judgment.   Mildly dysthymic mood   Physically looks healthy       REVIEWED DATA     Labs:     Lab Results   Component Value Date     08/09/2022    K 4.3 08/09/2022    CALCIUM 9.4 08/09/2022    AST 15 08/09/2022    ALT 8 08/09/2022    BUN 15 08/09/2022    CREATININE 0.84 08/09/2022    CREATININE 0.75 09/17/2021    CREATININE 0.70 10/04/2020    EGFRRESULT 89 08/09/2022       Lab Results   Component Value Date    HGBA1C 4.70 (L) 09/17/2021    HGBA1C 4.9 07/10/2020    HGBA1C 5.00 05/15/2020       Lab Results   Component Value Date     (H) 08/09/2022     (H) 09/17/2021     (H) 07/09/2018    HDL 64 08/09/2022    HDL 52 09/17/2021    TRIG 52 08/09/2022    TRIG 47 09/17/2021       Lab Results   Component Value Date    TSH 3.370 04/01/2024    TSH 10.800 (H) 02/12/2024    TSH 5.950 (H) 09/28/2023    FREET4 1.51 04/01/2024    FREET4 1.29 02/12/2024    FREET4 1.40 09/28/2023       Lab Results   Component Value Date    WBC 3.61 02/12/2024    HGB 10.9 (L) 02/12/2024     02/12/2024     Lab Results   Component Value Date    [id number] 159 (L) 08/16/2023      No results found for: \"MALBCRERATIO\"          Imaging:               Medical Tests:               Summary of old records / correspondence / consultant report:             Request outside records:       "

## 2024-06-12 NOTE — ASSESSMENT & PLAN NOTE
Taking B12 1000 mcg SL daily.   Check B12 level today.     Lab Results   Component Value Date    [id number] 159 (L) 08/16/2023

## 2024-07-25 DIAGNOSIS — E89.0 POSTABLATIVE HYPOTHYROIDISM: ICD-10-CM

## 2024-07-25 RX ORDER — LEVOTHYROXINE SODIUM 175 MCG
TABLET ORAL
Qty: 30 TABLET | Refills: 0 | Status: SHIPPED | OUTPATIENT
Start: 2024-07-25

## 2024-07-25 RX ORDER — LEVOTHYROXINE SODIUM 25 MCG
12.5 TABLET ORAL EVERY MORNING
Qty: 15 TABLET | Refills: 0 | Status: SHIPPED | OUTPATIENT
Start: 2024-07-25

## 2024-08-11 DIAGNOSIS — F39 MOOD DISORDER: ICD-10-CM

## 2024-08-12 DIAGNOSIS — F39 MOOD DISORDER: ICD-10-CM

## 2024-08-12 RX ORDER — SERTRALINE HYDROCHLORIDE 25 MG/1
25 TABLET, FILM COATED ORAL EVERY MORNING
Qty: 30 TABLET | Refills: 5 | Status: SHIPPED | OUTPATIENT
Start: 2024-08-12

## 2024-08-12 RX ORDER — SERTRALINE HYDROCHLORIDE 25 MG/1
25 TABLET, FILM COATED ORAL EVERY MORNING
Qty: 30 TABLET | Refills: 1 | Status: CANCELLED | OUTPATIENT
Start: 2024-08-12

## 2024-08-22 DIAGNOSIS — E89.0 POSTABLATIVE HYPOTHYROIDISM: ICD-10-CM

## 2024-08-22 RX ORDER — LEVOTHYROXINE SODIUM 175 MCG
175 TABLET ORAL
Qty: 30 TABLET | Refills: 5 | Status: SHIPPED | OUTPATIENT
Start: 2024-08-22

## 2024-08-22 RX ORDER — LEVOTHYROXINE SODIUM 25 MCG
12.5 TABLET ORAL EVERY MORNING
Qty: 15 TABLET | Refills: 5 | Status: SHIPPED | OUTPATIENT
Start: 2024-08-22

## 2024-09-27 ENCOUNTER — OFFICE VISIT (OUTPATIENT)
Dept: INTERNAL MEDICINE | Age: 44
End: 2024-09-27
Payer: COMMERCIAL

## 2024-09-27 VITALS
WEIGHT: 137 LBS | HEIGHT: 66 IN | SYSTOLIC BLOOD PRESSURE: 100 MMHG | TEMPERATURE: 97.3 F | OXYGEN SATURATION: 99 % | HEART RATE: 69 BPM | BODY MASS INDEX: 22.02 KG/M2 | DIASTOLIC BLOOD PRESSURE: 64 MMHG

## 2024-09-27 DIAGNOSIS — F39 MOOD DISORDER: Primary | ICD-10-CM

## 2024-09-27 DIAGNOSIS — R09.81 NASAL CONGESTION: ICD-10-CM

## 2024-09-27 DIAGNOSIS — Z00.00 ENCOUNTER FOR ANNUAL HEALTH EXAMINATION: ICD-10-CM

## 2024-09-27 DIAGNOSIS — E89.0 POSTABLATIVE HYPOTHYROIDISM: Chronic | ICD-10-CM

## 2024-09-27 LAB
EXPIRATION DATE: NORMAL
FLUAV AG UPPER RESP QL IA.RAPID: NOT DETECTED
FLUBV AG UPPER RESP QL IA.RAPID: NOT DETECTED
INTERNAL CONTROL: NORMAL
Lab: NORMAL
SARS-COV-2 AG UPPER RESP QL IA.RAPID: NOT DETECTED

## 2024-09-27 PROCEDURE — 87428 SARSCOV & INF VIR A&B AG IA: CPT | Performed by: INTERNAL MEDICINE

## 2024-09-27 PROCEDURE — 99214 OFFICE O/P EST MOD 30 MIN: CPT | Performed by: INTERNAL MEDICINE

## 2024-09-27 RX ORDER — SERTRALINE HYDROCHLORIDE 25 MG/1
50 TABLET, FILM COATED ORAL DAILY
Qty: 30 TABLET | Refills: 3 | Status: SHIPPED | OUTPATIENT
Start: 2024-09-27 | End: 2024-09-27 | Stop reason: SDUPTHER

## 2024-10-29 ENCOUNTER — APPOINTMENT (OUTPATIENT)
Dept: WOMENS IMAGING | Facility: HOSPITAL | Age: 44
End: 2024-10-29
Payer: COMMERCIAL

## 2024-10-29 PROCEDURE — 77061 BREAST TOMOSYNTHESIS UNI: CPT | Performed by: RADIOLOGY

## 2024-10-29 PROCEDURE — 77065 DX MAMMO INCL CAD UNI: CPT | Performed by: RADIOLOGY

## 2024-10-29 PROCEDURE — 76642 ULTRASOUND BREAST LIMITED: CPT | Performed by: RADIOLOGY

## 2024-10-29 PROCEDURE — G0279 TOMOSYNTHESIS, MAMMO: HCPCS | Performed by: RADIOLOGY

## 2024-11-18 ENCOUNTER — APPOINTMENT (OUTPATIENT)
Dept: WOMENS IMAGING | Facility: HOSPITAL | Age: 44
End: 2024-11-18
Payer: COMMERCIAL

## 2024-11-18 PROCEDURE — 77065 DX MAMMO INCL CAD UNI: CPT | Performed by: RADIOLOGY

## 2024-11-18 PROCEDURE — 77061 BREAST TOMOSYNTHESIS UNI: CPT | Performed by: RADIOLOGY

## 2024-11-18 PROCEDURE — G0279 TOMOSYNTHESIS, MAMMO: HCPCS | Performed by: RADIOLOGY

## 2024-12-27 DIAGNOSIS — F39 MOOD DISORDER: ICD-10-CM

## 2025-02-02 DIAGNOSIS — F39 MOOD DISORDER: ICD-10-CM

## 2025-02-20 DIAGNOSIS — E89.0 POSTABLATIVE HYPOTHYROIDISM: ICD-10-CM

## 2025-02-21 RX ORDER — LEVOTHYROXINE SODIUM 25 MCG
12.5 TABLET ORAL EVERY MORNING
Qty: 15 TABLET | Refills: 5 | Status: SHIPPED | OUTPATIENT
Start: 2025-02-21

## 2025-02-21 RX ORDER — LEVOTHYROXINE SODIUM 175 MCG
175 TABLET ORAL
Qty: 30 TABLET | Refills: 5 | Status: SHIPPED | OUTPATIENT
Start: 2025-02-21

## 2025-02-26 ENCOUNTER — OFFICE VISIT (OUTPATIENT)
Dept: INTERNAL MEDICINE | Age: 45
End: 2025-02-26
Payer: COMMERCIAL

## 2025-02-26 VITALS
OXYGEN SATURATION: 99 % | SYSTOLIC BLOOD PRESSURE: 108 MMHG | WEIGHT: 141 LBS | BODY MASS INDEX: 22.66 KG/M2 | HEART RATE: 63 BPM | TEMPERATURE: 97.1 F | DIASTOLIC BLOOD PRESSURE: 74 MMHG | HEIGHT: 66 IN

## 2025-02-26 DIAGNOSIS — Z00.00 ENCOUNTER FOR ANNUAL HEALTH EXAMINATION: Primary | ICD-10-CM

## 2025-02-26 DIAGNOSIS — Z12.11 SCREENING FOR COLON CANCER: ICD-10-CM

## 2025-02-26 DIAGNOSIS — D51.0 PERNICIOUS ANEMIA: Chronic | ICD-10-CM

## 2025-02-26 DIAGNOSIS — F39 MOOD DISORDER: Chronic | ICD-10-CM

## 2025-02-26 DIAGNOSIS — G43.109 MIGRAINE WITH AURA AND WITHOUT STATUS MIGRAINOSUS, NOT INTRACTABLE: Chronic | ICD-10-CM

## 2025-02-26 DIAGNOSIS — E89.0 POSTABLATIVE HYPOTHYROIDISM: Chronic | ICD-10-CM

## 2025-02-26 DIAGNOSIS — R92.8 ABNORMAL MAMMOGRAM: ICD-10-CM

## 2025-02-26 DIAGNOSIS — E78.00 PURE HYPERCHOLESTEROLEMIA: Chronic | ICD-10-CM

## 2025-02-26 PROBLEM — G89.29 CHRONIC LEFT-SIDED LOW BACK PAIN WITHOUT SCIATICA: Chronic | Status: RESOLVED | Noted: 2023-02-09 | Resolved: 2025-02-26

## 2025-02-26 PROBLEM — M22.2X1 PATELLOFEMORAL PAIN SYNDROME OF RIGHT KNEE: Status: RESOLVED | Noted: 2023-08-10 | Resolved: 2025-02-26

## 2025-02-26 PROBLEM — I95.9 HYPOTENSION: Chronic | Status: RESOLVED | Noted: 2022-02-04 | Resolved: 2025-02-26

## 2025-02-26 PROBLEM — M54.50 CHRONIC LEFT-SIDED LOW BACK PAIN WITHOUT SCIATICA: Chronic | Status: RESOLVED | Noted: 2023-02-09 | Resolved: 2025-02-26

## 2025-02-26 PROCEDURE — 99214 OFFICE O/P EST MOD 30 MIN: CPT | Performed by: INTERNAL MEDICINE

## 2025-02-26 PROCEDURE — 99396 PREV VISIT EST AGE 40-64: CPT | Performed by: INTERNAL MEDICINE

## 2025-02-26 NOTE — ASSESSMENT & PLAN NOTE
Lab Results   Component Value Date    LDL 90 02/18/2025     (H) 08/09/2022     (H) 09/17/2021    TRIG 40 02/18/2025    CHOLHDLRATIO 2.52 02/18/2025      Doing better. Maintain low saturated fat/cholesterol diet.

## 2025-02-26 NOTE — ASSESSMENT & PLAN NOTE
Failed breast biopsy with plan for repeat exam in May.   Consider seeing breast specialist if needed.

## 2025-02-26 NOTE — PROGRESS NOTES
"        J  U  N  O  H    K  I  M ,   M  D      I  N  T  E  R  N  A  L    M  E  D  I  C  I  N  E         ENCOUNTER DATE:  02/26/2025    Renetta TRAN Eleanor / 44 y.o. / female    ANNUAL PREVENTATIVE / PHYSICAL ENCOUNTER       CHIEF COMPLAINT     Annual Exam (9/24/21), Mood disorder, and Hypothyroidism      VITALS     Vitals:    02/26/25 0950   BP: 108/74   Pulse: 63   Temp: 97.1 °F (36.2 °C)   SpO2: 99%   Weight: 64 kg (141 lb)   Height: 167.6 cm (66\")       BP Readings from Last 3 Encounters:   02/26/25 108/74   09/27/24 100/64   06/12/24 96/74     Wt Readings from Last 3 Encounters:   02/26/25 64 kg (141 lb)   09/27/24 62.1 kg (137 lb)   06/12/24 64 kg (141 lb)      Body mass index is 22.76 kg/m².    Blood pressure readings recorded on patient flowsheet:       No data to display                  MEDICATIONS     Current Outpatient Medications on File Prior to Visit   Medication Sig Dispense Refill    cetirizine (zyrTEC) 10 MG tablet Take 1 tablet by mouth Daily As Needed.      Cyanocobalamin (B-12-SL) 1000 MCG sublingual tablet Place 1 tablet under the tongue Daily.      folic acid (FOLVITE) 400 MCG tablet Take  by mouth.      Omega-3 Fatty Acids (FISH OIL) 1000 MG capsule capsule Take  by mouth.      sertraline (Zoloft) 50 MG tablet Take 1 tablet by mouth Daily. 90 tablet 1    Synthroid 175 MCG tablet TAKE 1 TABLET BY MOUTH EVERY MORNING 30 tablet 5    Synthroid 25 MCG tablet TAKE 1/2 TABLET BY MOUTH EVERY MORNING 15 tablet 5     No current facility-administered medications on file prior to visit.         HISTORY OF PRESENT ILLNESS     Renetta reports higher than normal degree of stress in her personal life due to health issues as well as other factors.  In spite of this she is doing relatively well on sertraline 50 mg daily.  Hypothyroidism remains stable on Synthroid 175 +25 mcg daily.  She gets occasional migraine aura and takes over-the-counter medication as needed.  This seems to be triggered by rigorous physical " exercise.  Her cholesterol levels have improved since last time.  She is on B12 sublingually for pernicious anemia.  She has had abnormal mammography with prior history of breast augmentation and had difficulty with recent attempts of breast biopsy.  The plan is for her to repeat imaging studies in May.    Patient Care Team:  Óscar Greene MD as PCP - General  Tiffany Goff MD as Consulting Physician (Obstetrics and Gynecology)    General health: good  Lifestyle:  Attempting to lose weight?: No   Diet: eats a well balanced, healthy diet  Exercise: exercises 5 days weekly  Tobacco: Never used   Alcohol: does not drink  Work: Full-time  Reproductive health:  Sexually active?: Yes   Sexual problems?: No problems  Concern for STD?: No    Sees Gynecologist?: Yes   Love/Postmenopausal?: Yes   Depression Screening:        PHQ-2 Depression Screening  Little interest or pleasure in doing things? Not at all   Feeling down, depressed, or hopeless? Not at all   PHQ-2 Total Score 0            2/26/2025     9:53 AM   PHQ-2/PHQ-9 Depression Screening   Little interest or pleasure in doing things Not at all   Feeling down, depressed, or hopeless Not at all   How difficult have these problems made it for you to do your work, take care of things at home, or get along with other people? Not difficult at all        PHQ-2: 0 (Not depressed)   PHQ-9: 0 (Negative screening for depression)    ALLERGIES  Allergies   Allergen Reactions    Cefprozil Other (See Comments) and Nausea And Vomiting     Yeast infection        PFSH:     The following portions of the patient's history were reviewed and updated as appropriate: Allergies / Current Medications / Past Medical History / Surgical History / Social History / Family History    PROBLEM LIST   Patient Active Problem List   Diagnosis    Hypothyroidism    Pernicious anemia    Allergic rhinitis due to pollen    Pure hypercholesterolemia    Attention deficit    Migraine with aura and without  status migrainosus, not intractable    Mood disorder    Abnormal mammogram       PAST MEDICAL HISTORY  Past Medical History:   Diagnosis Date    Chronic left-sided low back pain without sciatica 2023    Hypothyroidism     Patellofemoral pain syndrome of right knee 08/10/2023       SURGICAL HISTORY  Past Surgical History:   Procedure Laterality Date    BREAST AUGMENTATION       SECTION  10/04/2020       SOCIAL HISTORY  Social History     Socioeconomic History    Marital status:      Spouse name: Brandt    Number of children: 2   Tobacco Use    Smoking status: Never    Smokeless tobacco: Never   Vaping Use    Vaping status: Never Used   Substance and Sexual Activity    Alcohol use: No    Drug use: Never    Sexual activity: Yes     Partners: Male     Birth control/protection: Tubal ligation       FAMILY HISTORY  Family History   Problem Relation Age of Onset    Obesity Mother     Diabetes type II Mother     Hypothyroidism Mother     Diabetes Father     Other (Alcohol Cirrhosis) Father     No Known Problems Sister     No Known Problems Sister     Cancer Paternal Grandmother     Colon cancer Paternal Aunt     Throat cancer Paternal Uncle        IMMUNIZATION HISTORY  Immunization History   Administered Date(s) Administered    COVID-19 (PFIZER) Purple Cap Monovalent 2021, 2021, 2021    Flu Vaccine Quad PF >36MO 2020    Flu Vaccine Split Quad 2020    Fluzone (or Fluarix & Flulaval for VFC) >6mos 2020, 2021    Tdap 2020         REVIEW OF SYSTEMS     Review of Systems   Constitutional: Negative.    HENT: Negative.     Eyes: Negative.    Respiratory: Negative.     Cardiovascular: Negative.    Gastrointestinal: Negative.    Endocrine: Negative.    Genitourinary: Negative.    Musculoskeletal: Negative.    Skin: Negative.    Allergic/Immunologic: Negative.    Neurological: Negative.  Headaches: migraine with aura.   Hematological: Negative.     Psychiatric/Behavioral: Negative.          High stress          PHYSICAL EXAMINATION     Physical Exam  Constitutional:       General: She is not in acute distress.     Appearance: Normal appearance. She is well-developed.   HENT:      Head: Normocephalic and atraumatic.      Right Ear: Tympanic membrane, ear canal and external ear normal.      Left Ear: Tympanic membrane, ear canal and external ear normal.      Mouth/Throat:      Mouth: Mucous membranes are moist.      Pharynx: Oropharynx is clear.   Eyes:      General: No scleral icterus.     Conjunctiva/sclera: Conjunctivae normal.      Pupils: Pupils are equal, round, and reactive to light.   Neck:      Thyroid: No thyroid mass or thyromegaly.      Vascular: No carotid bruit.      Trachea: No tracheal deviation.   Cardiovascular:      Rate and Rhythm: Normal rate and regular rhythm.      Pulses: Normal pulses.      Heart sounds: Normal heart sounds.   Pulmonary:      Effort: Pulmonary effort is normal.      Breath sounds: Normal breath sounds.   Abdominal:      General: There is no distension.      Palpations: Abdomen is soft. There is no mass.      Tenderness: There is no abdominal tenderness.      Hernia: No hernia is present.   Genitourinary:     Comments: Deferred to gyne/by patient unless specified otherwise.   Musculoskeletal:      Cervical back: Neck supple. No tenderness.      Right lower leg: No edema.   Lymphadenopathy:      Cervical: No cervical adenopathy.   Skin:     General: Skin is warm.      Coloration: Skin is not jaundiced or pale.      Findings: No rash.   Neurological:      General: No focal deficit present.      Mental Status: She is alert and oriented to person, place, and time.      Cranial Nerves: No cranial nerve deficit.      Motor: No abnormal muscle tone.      Coordination: Coordination normal.      Deep Tendon Reflexes: Reflexes normal.   Psychiatric:         Mood and Affect: Mood normal.         Behavior: Behavior normal.          Thought Content: Thought content normal.         Judgment: Judgment normal.         REVIEWED DATA      Labs:    Lab Results   Component Value Date     02/18/2025    K 4.2 02/18/2025    CALCIUM 9.2 02/18/2025    AST 25 02/18/2025    ALT 14 02/18/2025    BUN 23 (H) 02/18/2025    CREATININE 0.96 02/18/2025    CREATININE 0.84 08/09/2022    CREATININE 0.75 09/17/2021    EGFRIFNONA 85 09/17/2021    EGFRIFAFRI 103 09/17/2021     Lab Results   Component Value Date    GLUCOSE 82 02/18/2025    HGBA1C 5.00 02/18/2025    HGBA1C 4.70 (L) 09/17/2021    HGBA1C 4.9 07/10/2020    TSH 2.890 02/18/2025    FREET4 1.57 02/18/2025     Lab Results   Component Value Date    LDL 90 02/18/2025    HDL 65 (H) 02/18/2025    TRIG 40 02/18/2025    CHOLHDLRATIO 2.52 02/18/2025     Lab Results   Component Value Date    TCOB56BD 52.9 07/10/2020      Lab Results   Component Value Date    WBC 3.64 02/18/2025    HGB 10.7 (L) 02/18/2025    MCV 76.0 (L) 02/18/2025     02/18/2025     Lab Results   Component Value Date    PROTEIN Negative 02/18/2025    GLUCOSEU Negative 02/18/2025    BLOODU Negative 02/18/2025    NITRITEU Negative 02/18/2025    LEUKOCYTESUR Negative 02/18/2025     Lab Results   Component Value Date    HEPCVIRUSABY 0.1 09/17/2021       Imaging:                Medical Tests:              Summary of old records / correspondence / consultant report:               Request outside records:         ASSESSMENT & PLAN     ANNUAL WELLNESS EXAM / PHYSICAL     Other medical problems addressed today:  Problem List Items Addressed This Visit          High    Hypothyroidism (Chronic)    Overview     H/o Grave's Dz s/p Rad I ablation         Current Assessment & Plan     Lab Results   Component Value Date    TSH 2.890 02/18/2025    TSH 3.240 06/12/2024    TSH 3.370 04/01/2024    FREET4 1.57 02/18/2025    FREET4 1.53 06/12/2024    FREET4 1.51 04/01/2024      Stable. Continue Synthroid 175 + 25 mcg qAM.         Relevant Medications    Synthroid  175 MCG tablet    Synthroid 25 MCG tablet    Mood disorder (Chronic)    Overview     Continue sertraline 50 mg daily          Current Assessment & Plan     Doing well in spite of higher stress currently. Continue sertraline 50 mg daily.          Relevant Medications    sertraline (Zoloft) 50 MG tablet       Medium    Pernicious anemia (Chronic)    Overview     Continue B12 1000 mcg SL daily          Relevant Medications    folic acid (FOLVITE) 400 MCG tablet    Cyanocobalamin (B-12-SL) 1000 MCG sublingual tablet    Migraine with aura and without status migrainosus, not intractable (Chronic)    Current Assessment & Plan     Can try cGRP (Ubrelvy) PRN. Can provide sample.          Relevant Medications    sertraline (Zoloft) 50 MG tablet       Low    Abnormal mammogram    Overview     Managed by gynecologist          Current Assessment & Plan     Failed breast biopsy with plan for repeat exam in May.   Consider seeing breast specialist if needed.             Unprioritized    Pure hypercholesterolemia (Chronic)    Current Assessment & Plan        Lab Results   Component Value Date    LDL 90 02/18/2025     (H) 08/09/2022     (H) 09/17/2021    TRIG 40 02/18/2025    CHOLHDLRATIO 2.52 02/18/2025      Doing better. Maintain low saturated fat/cholesterol diet.            Other Visit Diagnoses       Encounter for annual health examination    -  Primary    Screening for colon cancer        Relevant Orders    Ambulatory Referral For Screening Colonoscopy            Orders Placed This Encounter   Procedures    Ambulatory Referral For Screening Colonoscopy     Referral Priority:   Routine     Referral Type:   Diagnostic Medical     Referral Reason:   Specialty Services Required     Referral Location[facility]   [facility]     Referred to Provider:   Evert Bhagat MD     Number of Visits Requested:   1        Summary/Discussion:         Next Appointment with me: Visit date not found    Return in about 6 months (around  8/26/2025) for Reassess chronic medical problems.      HEALTHCARE MAINTENANCE ISSUES     Health Maintenance   Topic Date Due    ANNUAL PHYSICAL  09/24/2022    COLORECTAL CANCER SCREENING  Never done    INFLUENZA VACCINE  03/31/2025 (Originally 7/1/2024)    LIPID PANEL  02/18/2026    MAMMOGRAM  11/04/2026    PAP SMEAR  09/16/2027    TDAP/TD VACCINES (2 - Td or Tdap) 06/01/2030    HEPATITIS C SCREENING  Completed    Pneumococcal Vaccine 0-49  Aged Out    COVID-19 Vaccine  Discontinued       Cancer Screening:  Colon: Initial/Next screening at age: DUE NOW and ORDERED COLONOSCOPY  Repeat colon cancer screening: N/A at this time  Breast: Recommended monthly self exams; annual professional exam  Mammogram:  Abnormal mammogram/failed biopsy to repeat in May (managed by OB)  Cervical: 1 year  Lung: Does not meet criteria for lung cancer screening.   Skin: Monthly self skin examination, annual exam by health professional  Other:    Screening Labs & Tests:  CV Screening: Lipid panel  DEXA (65+ or postmenopausal with risk factors): N/A  Other:     Immunization/Vaccinations:    Immunization History   Administered Date(s) Administered    COVID-19 (PFIZER) Purple Cap Monovalent 03/24/2021, 04/21/2021, 11/11/2021    Flu Vaccine Quad PF >36MO 09/11/2020    Flu Vaccine Split Quad 09/11/2020    Fluzone (or Fluarix & Flulaval for VFC) >6mos 09/11/2020, 09/24/2021    Tdap 06/01/2020       Influenza: Patient deferred/declined flu vaccine (recommended annual vaccination)  Hepatitis A: Recommended here or at pharmacy  Hepatitis B: Advised to consider vaccine  Tetanus/Pertussis: Up to date  Pneumococcal: N/A  Shingles: Not needed at this time  COVID: Does not plan to get the latest booster  RSV: Not indicated  HPV: Advised to consider vaccine    Lifestyle Counseling:  Lifestyle Modifications: Follow a low fat, low cholesterol diet, Discussed better management of stress/anxiety, and Discussed sexual issues, safe sex practices,  contraception  Safety Issues: Always wear seatbelt, Avoid texting while driving   Use sunscreen, regular skin examination  Recommended annual dental/vision examination.  Emotional/Stress/Sleep: Reviewed and  given when appropriate

## 2025-02-26 NOTE — ASSESSMENT & PLAN NOTE
Lab Results   Component Value Date    TSH 2.890 02/18/2025    TSH 3.240 06/12/2024    TSH 3.370 04/01/2024    FREET4 1.57 02/18/2025    FREET4 1.53 06/12/2024    FREET4 1.51 04/01/2024      Stable. Continue Synthroid 175 + 25 mcg qAM.

## 2025-03-07 DIAGNOSIS — D50.9 MICROCYTIC ANEMIA: Primary | ICD-10-CM

## 2025-03-07 RX ORDER — FERROUS SULFATE 325(65) MG
325 TABLET ORAL
COMMUNITY
Start: 2025-03-07 | End: 2025-06-05

## 2025-03-07 NOTE — ASSESSMENT & PLAN NOTE
"Lab Results   Component Value Date    WBC 3.64 02/18/2025    HGB 10.7 (L) 02/18/2025    HCT 35.2 02/18/2025    MCV 76.0 (L) 02/18/2025     02/18/2025     Lab Results   Component Value Date    HGB 10.7 (L) 02/18/2025    HGB 10.9 (L) 02/12/2024    HGB 13.5 08/09/2022     No results found for: \"FERRITIN\", \"IRONSERUM\", \"LABIRON\", \"TIBC\"  Lab Results   Component Value Date    CLFBKNPS14 1,762 (H) 06/12/2024    TQZVUCRZ48 159 (L) 08/16/2023    ADPESPFM82 271 12/14/2017    FOLATE 10.54 12/14/2017    FOLATE 8.03 06/13/2016     No results found for: \"OCCULTBLD\", \"OBFECALIMM\"     Most likely iron deficiency anemia.   Start ferrous sulfate 325 mg daily x 3 months. Will recheck labs including H/H and iron studies on follow-up in August.   "

## 2025-05-05 ENCOUNTER — APPOINTMENT (OUTPATIENT)
Dept: WOMENS IMAGING | Facility: HOSPITAL | Age: 45
End: 2025-05-05
Payer: COMMERCIAL

## 2025-05-05 PROCEDURE — G0279 TOMOSYNTHESIS, MAMMO: HCPCS | Performed by: RADIOLOGY

## 2025-05-05 PROCEDURE — 77065 DX MAMMO INCL CAD UNI: CPT | Performed by: RADIOLOGY

## 2025-05-05 PROCEDURE — 77061 BREAST TOMOSYNTHESIS UNI: CPT | Performed by: RADIOLOGY

## 2025-08-27 DIAGNOSIS — E89.0 POSTABLATIVE HYPOTHYROIDISM: ICD-10-CM

## 2025-08-28 RX ORDER — LEVOTHYROXINE SODIUM 175 MCG
175 TABLET ORAL
Qty: 30 TABLET | Refills: 5 | Status: SHIPPED | OUTPATIENT
Start: 2025-08-28